# Patient Record
Sex: FEMALE | Race: WHITE | NOT HISPANIC OR LATINO | Employment: FULL TIME | URBAN - METROPOLITAN AREA
[De-identification: names, ages, dates, MRNs, and addresses within clinical notes are randomized per-mention and may not be internally consistent; named-entity substitution may affect disease eponyms.]

---

## 2017-06-13 ENCOUNTER — OFFICE VISIT (OUTPATIENT)
Dept: ORTHOPEDIC SURGERY | Facility: CLINIC | Age: 22
End: 2017-06-13

## 2017-06-13 VITALS
HEART RATE: 78 BPM | SYSTOLIC BLOOD PRESSURE: 164 MMHG | HEIGHT: 64 IN | BODY MASS INDEX: 50.02 KG/M2 | DIASTOLIC BLOOD PRESSURE: 73 MMHG | WEIGHT: 293 LBS

## 2017-06-13 DIAGNOSIS — E66.01 MORBID OBESITY WITH BMI OF 50.0-59.9, ADULT (HCC): ICD-10-CM

## 2017-06-13 DIAGNOSIS — R52 PAIN: Primary | ICD-10-CM

## 2017-06-13 DIAGNOSIS — M22.2X2 PATELLOFEMORAL DISORDER, LEFT: ICD-10-CM

## 2017-06-13 PROCEDURE — 99204 OFFICE O/P NEW MOD 45 MIN: CPT | Performed by: ORTHOPAEDIC SURGERY

## 2017-06-13 NOTE — PROGRESS NOTES
Orthopaedic Clinic Note: Knee New Patient    Chief Complaint   Patient presents with   • Left Knee - Pain        HPI    Layne Aguilar is a 22 y.o. morbidly obese female who presents with left knee pain for 6 month(s). Onset Was atraumatic in nature and has been gradually increasing in both frequency and severity since onset 6 months ago.. Pain is localized to the anterior knee and is a 6/10 on the pain scale. The pain is worse with climbing stairs, sitting and rising from seated position; ice and elevating the extremity make it better. Previous treatments have included: bracing and NSAIDS since symptom onset. Although some transient relief was reported with these interventions, these conservative measures have failed and symptoms have persisted. The patient is limited in daily activities and has had a significant decrease in quality of life as a result. She denies fevers, chills, or constitutional symptoms.    Past Medical History:   Diagnosis Date   • Acute maxillary sinusitis    • Acute pharyngitis    • Fatigue    • Knee pain       Past Surgical History:   Procedure Laterality Date   • ADENOIDECTOMY     • TONSILLECTOMY        Social History     Social History   • Marital status: Single     Spouse name: N/A   • Number of children: N/A   • Years of education: N/A     Occupational History   • Not on file.     Social History Main Topics   • Smoking status: Never Smoker   • Smokeless tobacco: Never Used   • Alcohol use No   • Drug use: No   • Sexual activity: Not on file     Other Topics Concern   • Not on file     Social History Narrative      Current Outpatient Prescriptions on File Prior to Visit   Medication Sig Dispense Refill   • [DISCONTINUED] phentermine 37.5 MG capsule Take 37.5 mg by mouth every morning.     • [DISCONTINUED] TRI-SPRINTEC 0.18/0.215/0.25 MG-35 MCG per tablet        No current facility-administered medications on file prior to visit.       No Known Allergies     Review of Systems  "  Constitutional: Negative.    HENT: Negative.    Eyes: Negative.    Respiratory: Negative.    Cardiovascular: Negative.    Gastrointestinal: Negative.    Endocrine: Negative.    Genitourinary: Negative.    Musculoskeletal: Positive for arthralgias, gait problem and joint swelling.   Skin: Negative.    Allergic/Immunologic: Negative.    Hematological: Negative.    Psychiatric/Behavioral: Negative.         The following portions of the patient's history were reviewed and updated as appropriate: allergies, current medications, past family history, past medical history, past social history, past surgical history and problem list.    Physical Exam  Blood pressure 164/73, pulse 78, height 64\" (162.6 cm), weight (!) 318 lb (144 kg), not currently breastfeeding.    Body mass index is 54.58 kg/(m^2).    GENERAL APPEARANCE: awake, alert & oriented x 3, in no acute distress, well developed, well nourished and obese  PSYCH: normal affect  LUNGS:  breathing nonlabored  EYES: sclera anicteric  CARDIOVASCULAR: palpable dorsalis pedis, palpable posterior tibial bilaterally. Capillary refill less than 2 seconds  EXTREMITIES: no clubbing, cyanosis  GAIT:  Normal            Right Lower Extremity Exam:   ----------  Hip Exam  ----------  FLEXION CONTRACTURE: None  FLEXION: 110 degrees  INTERNAL ROTATION: 20 degrees at 90 degrees of flexion   EXTERNAL ROTATION: 40 degrees at 90 degrees of flexion    PAIN WITH HIP MOTION: no  ----------  Knee Exam  ----------  ALIGNMENT: neutral, no varus or valgus deformity     RANGE OF MOTION:  Normal (0-130 degrees) with no extensor lag or flexion contracture  LIGAMENTOUS STABILITY:   stable to varus and valgus stress at 0 and 30 degrees without any evidence of laxity     STRENGTH:  5/5 knee flexion, extension. 5/5 ankle dorsiflexion and plantarflexion.     PAIN WITH PALPATION: denies tenderness to palpation about the knee, denies medial or lateral joint line pain  KNEE EFFUSION: no  PAIN WITH KNEE " ROM: no  PATELLAR CREPITUS: no  SPECIAL EXAM FINDINGS:  Negative patellar compression    REFLEXES:  PATELLAR 2+/4  ACHILLES 2+/4    CLONUS: negative  STRAIGHT LEG TEST:   negative    SENSATION TO LIGHT TOUCH:  DEEP PERONEAL/SUPERFICIAL PERONEAL/SURAL/SAPHENOUS/TIBIAL:   intact    EDEMA:  no  ERYTHEMA:  no  WOUNDS/INCISIONS: none, no overlying skin problems.      Left Lower Extremity Exam:   ----------  Hip Exam  ----------  FLEXION CONTRACTURE: None  FLEXION: 110 degrees  INTERNAL ROTATION: 20 degrees at 90 degrees of flexion   EXTERNAL ROTATION: 40 degrees at 90 degrees of flexion    PAIN WITH HIP MOTION: no  ----------  Knee Exam  ----------  ALIGNMENT: neutral, no varus or valgus deformity     RANGE OF MOTION:  Normal (0-130 degrees) with no extensor lag or flexion contracture  LIGAMENTOUS STABILITY:   stable to varus and valgus stress at 0 and 30 degrees without any evidence of laxity     STRENGTH:  5/5 knee flexion, extension. 5/5 ankle dorsiflexion and plantarflexion.     PAIN WITH PALPATION: denies tenderness to palpation about the knee, denies medial or lateral joint line pain  KNEE EFFUSION: no  PAIN WITH KNEE ROM: Yes, anteriorly with deep knee flexion   PATELLAR CREPITUS: yes, painful and symptomatic  SPECIAL EXAM FINDINGS:  Positive patellar compression    REFLEXES:  PATELLAR 2+/4  ACHILLES 2+/4    CLONUS: negative  STRAIGHT LEG TEST:   negative    SENSATION TO LIGHT TOUCH:  DEEP PERONEAL/SUPERFICIAL PERONEAL/SURAL/SAPHENOUS/TIBIAL:   intact    EDEMA:  no  ERYTHEMA:  no  WOUNDS/INCISIONS: none, no overlying skin problems.    ______________________________________________________________________  ______________________________________________________________________    RADIOGRAPHIC FINDINGS:   Indication: Left knee pain    Comparison: No prior xrays are available for comparison    Left knee 2views: minimal arthritic findings tricompartmentally      Assessment/Plan:   Diagnosis Plan   1. Pain  XR Knee 1 or  2 View Left   2. Patellofemoral disorder, left  Ambulatory Referral to Physical Therapy Evaluate and treat   3. Morbid obesity with BMI of 50.0-59.9, adult       I had an extensive discussion with patient regarding the etiology of her ongoing knee pain.  Her symptoms are related to patellofemoral arthralgia secondary to core, quad, hip relative weakness.  Therefore recommend referral physical therapy.  In addition to this I discussed with her extensively problem of obesity contributing to this and that if she does not correct her obesity, she will likely require joint replacement at a much earlier age secondary to increase joint reaction forces and wear.  She expressed understanding for this.  I will see her back on an as-needed basis.    Rickey Fountain MD  06/13/17  4:35 PM

## 2017-07-10 NOTE — TELEPHONE ENCOUNTER
Contacted patient.Patient  states that she has not been on birthcontrol for over 6 months. Patient will have to come in before she can get back on birthcontrol.

## 2017-10-13 ENCOUNTER — OFFICE VISIT (OUTPATIENT)
Dept: FAMILY MEDICINE CLINIC | Facility: CLINIC | Age: 22
End: 2017-10-13

## 2017-10-13 VITALS
HEIGHT: 64 IN | WEIGHT: 293 LBS | OXYGEN SATURATION: 99 % | TEMPERATURE: 98.6 F | BODY MASS INDEX: 50.02 KG/M2 | RESPIRATION RATE: 16 BRPM | SYSTOLIC BLOOD PRESSURE: 128 MMHG | HEART RATE: 66 BPM | DIASTOLIC BLOOD PRESSURE: 82 MMHG

## 2017-10-13 DIAGNOSIS — E66.9 OBESITY (BMI 35.0-39.9 WITHOUT COMORBIDITY): Primary | ICD-10-CM

## 2017-10-13 PROCEDURE — 99213 OFFICE O/P EST LOW 20 MIN: CPT | Performed by: PHYSICIAN ASSISTANT

## 2017-10-13 RX ORDER — PHENTERMINE HYDROCHLORIDE 37.5 MG/1
37.5 TABLET ORAL
COMMUNITY
End: 2021-05-21 | Stop reason: SDUPTHER

## 2017-10-13 NOTE — PROGRESS NOTES
Daisy Aguilar is a 22 y.o. female    History of Present Illness  Patient is 22-year-old black female comes in complaining of morbid obesity she states she's trying to lose weight she just started a new diet along with exercise she like to try phentermine again she's been on this medication the past and lost up to 25 pounds on it.  Denies any shortness breath chest pain  The following portions of the patient's history were reviewed and updated as appropriate: allergies, current medications, past social history and problem list    Review of Systems   Constitutional: Negative for appetite change, diaphoresis, fatigue and unexpected weight change.   Eyes: Negative for visual disturbance.   Respiratory: Negative for cough, chest tightness and shortness of breath.    Cardiovascular: Negative for chest pain, palpitations and leg swelling.   Gastrointestinal: Negative for diarrhea, nausea and vomiting.   Endocrine: Negative for polydipsia, polyphagia and polyuria.   Skin: Negative for color change and rash.   Neurological: Negative for dizziness, syncope, weakness, light-headedness, numbness and headaches.       Objective     Vitals:    10/13/17 1033   BP: 128/82   Pulse: 66   Resp: 16   Temp: 98.6 °F (37 °C)   SpO2: 99%       Physical Exam   Constitutional: She appears well-developed and well-nourished.   Neck: Neck supple. No JVD present. No thyromegaly present.   Cardiovascular: Normal rate, regular rhythm, normal heart sounds, intact distal pulses and normal pulses.    No murmur heard.  Pulmonary/Chest: Effort normal and breath sounds normal. No respiratory distress.   Abdominal: Soft. Bowel sounds are normal. There is no hepatosplenomegaly. There is no tenderness.   Musculoskeletal: She exhibits no edema.   Lymphadenopathy:     She has no cervical adenopathy.   Neurological: No sensory deficit.   Skin: Skin is warm and dry. She is not diaphoretic.   Nursing note and vitals reviewed.      Assessment/Plan      Diagnoses and all orders for this visit:    Obesity (BMI 35.0-39.9 without comorbidity)  -     phentermine (ADIPEX-P) 37.5 MG tablet; Take 37.5 mg by mouth Every Morning Before Breakfast.    Low-fat, low calorie diet, discussed exercising 3-5 times per week for 30 minutes decrease caloric intake around 18 -2000 jacqui a day.-year-old weight loss goal of 2 pounds per week for the next 2 months she's follow-up in 2 months sussy

## 2019-09-19 ENCOUNTER — OFFICE VISIT (OUTPATIENT)
Dept: FAMILY MEDICINE CLINIC | Facility: CLINIC | Age: 24
End: 2019-09-19

## 2019-09-19 VITALS
HEART RATE: 78 BPM | DIASTOLIC BLOOD PRESSURE: 75 MMHG | SYSTOLIC BLOOD PRESSURE: 120 MMHG | WEIGHT: 293 LBS | HEIGHT: 63 IN | OXYGEN SATURATION: 99 % | RESPIRATION RATE: 14 BRPM | BODY MASS INDEX: 51.91 KG/M2

## 2019-09-19 DIAGNOSIS — E66.01 CLASS 3 SEVERE OBESITY DUE TO EXCESS CALORIES WITHOUT SERIOUS COMORBIDITY WITH BODY MASS INDEX (BMI) OF 50.0 TO 59.9 IN ADULT (HCC): Primary | ICD-10-CM

## 2019-09-19 PROCEDURE — 99213 OFFICE O/P EST LOW 20 MIN: CPT | Performed by: PHYSICIAN ASSISTANT

## 2019-09-19 NOTE — PROGRESS NOTES
Subjective   Layne Aguilar is a 24 y.o. female  Obesity (RF phentermine)      History of Present Illness  Patient is a pleasant 24-year-old black female who comes in with obesity issues, her BMI is over 50 she states she is short of breath she is tried phentermine before she did start a workout program she is going to nutritionist she is try to do things healthy she is exercising 3-5 times per week for 30 minutes  The following portions of the patient's history were reviewed and updated as appropriate: allergies, current medications, past social history and problem list    Review of Systems   Constitutional: Negative for fatigue and unexpected weight change.   Respiratory: Negative for cough, chest tightness and shortness of breath.    Cardiovascular: Negative for chest pain, palpitations and leg swelling.   Gastrointestinal: Negative for nausea.   Skin: Negative for color change and rash.   Neurological: Negative for dizziness, syncope, weakness and headaches.       Objective     Vitals:    09/19/19 1004   BP: 120/75   Pulse: 78   Resp: 14   SpO2: 99%       Physical Exam   Constitutional: She appears well-developed and well-nourished.   Neck: Neck supple. No JVD present. No thyromegaly present.   Cardiovascular: Normal rate, regular rhythm, normal heart sounds, intact distal pulses and normal pulses.   No murmur heard.  Pulmonary/Chest: Effort normal and breath sounds normal. No respiratory distress.   Abdominal: Soft. Bowel sounds are normal. There is no hepatosplenomegaly. There is no tenderness.   Musculoskeletal: She exhibits no edema.   Lymphadenopathy:     She has no cervical adenopathy.   Neurological: No sensory deficit.   Skin: Skin is warm and dry. She is not diaphoretic.   Nursing note and vitals reviewed.      Assessment/Plan     Diagnoses and all orders for this visit:    Class 3 severe obesity due to excess calories without serious comorbidity with body mass index (BMI) of 50.0 to 59.9 in adult  (CMS/HCC)    Phentermine 37.5 mg 1 p.o. every day dispense 30×3 refills    Long discussion with patient about diet and exercise low-carb low calorie diet exercise 3-5 times per week for 30 minutes she is

## 2021-05-21 ENCOUNTER — OFFICE VISIT (OUTPATIENT)
Dept: FAMILY MEDICINE CLINIC | Facility: CLINIC | Age: 26
End: 2021-05-21

## 2021-05-21 VITALS
WEIGHT: 293 LBS | HEART RATE: 90 BPM | BODY MASS INDEX: 50.02 KG/M2 | OXYGEN SATURATION: 98 % | TEMPERATURE: 97.1 F | DIASTOLIC BLOOD PRESSURE: 90 MMHG | RESPIRATION RATE: 16 BRPM | HEIGHT: 64 IN | SYSTOLIC BLOOD PRESSURE: 136 MMHG

## 2021-05-21 DIAGNOSIS — E66.01 MORBID (SEVERE) OBESITY DUE TO EXCESS CALORIES (HCC): ICD-10-CM

## 2021-05-21 DIAGNOSIS — Z00.00 ROUTINE GENERAL MEDICAL EXAMINATION AT A HEALTH CARE FACILITY: Primary | ICD-10-CM

## 2021-05-21 DIAGNOSIS — E66.9 OBESITY (BMI 35.0-39.9 WITHOUT COMORBIDITY): ICD-10-CM

## 2021-05-21 PROCEDURE — 99395 PREV VISIT EST AGE 18-39: CPT | Performed by: PHYSICIAN ASSISTANT

## 2021-05-21 RX ORDER — PHENTERMINE HYDROCHLORIDE 37.5 MG/1
37.5 TABLET ORAL
Qty: 30 TABLET | Refills: 3 | OUTPATIENT
Start: 2021-05-21 | End: 2022-08-29

## 2021-05-21 NOTE — PROGRESS NOTES
Subjective   Layne Aguilar is a 25 y.o. female  Annual Exam (Fasting for labs. )      History of Present Illness  Patient is a very pleasant 24-year-old female who comes in for a mammogram patient, she has lost 20 pounds, she is exercising healthy diet her BMI is 52.18.  She is exercising, watching food portions increase in her carbs  The following portions of the patient's history were reviewed and updated as appropriate: allergies, current medications, past social history and problem list    Review of Systems   Constitutional: Negative.    HENT: Negative.    Eyes: Negative.    Respiratory: Negative.    Cardiovascular: Negative.    Gastrointestinal: Negative.    Endocrine: Negative.    Genitourinary: Negative.    Musculoskeletal: Negative.    Skin: Negative.    Allergic/Immunologic: Negative.    Neurological: Negative.    Hematological: Negative.    Psychiatric/Behavioral: Negative.    All other systems reviewed and are negative.      Objective     Vitals:    05/21/21 1043   BP: 136/90   Pulse: 90   Resp: 16   Temp: 97.1 °F (36.2 °C)   SpO2: 98%       Physical Exam  Vitals and nursing note reviewed.   Constitutional:       Appearance: She is well-developed.   HENT:      Head: Normocephalic and atraumatic.      Right Ear: External ear normal.      Left Ear: External ear normal.      Nose: Nose normal.   Eyes:      Conjunctiva/sclera: Conjunctivae normal.      Pupils: Pupils are equal, round, and reactive to light.   Neck:      Thyroid: No thyromegaly.      Vascular: No carotid bruit or JVD.   Cardiovascular:      Rate and Rhythm: Normal rate and regular rhythm.      Heart sounds: Normal heart sounds. No murmur heard.     Pulmonary:      Effort: Pulmonary effort is normal.      Breath sounds: Normal breath sounds.   Abdominal:      General: Bowel sounds are normal.      Palpations: Abdomen is soft. There is no mass.      Tenderness: There is no abdominal tenderness.   Musculoskeletal:         General: Normal range of  motion.      Cervical back: Normal range of motion and neck supple.   Lymphadenopathy:      Cervical: No cervical adenopathy.   Skin:     General: Skin is warm and dry.   Neurological:      Mental Status: She is alert and oriented to person, place, and time.      Cranial Nerves: No cranial nerve deficit.      Deep Tendon Reflexes: Reflexes are normal and symmetric.         Assessment/Plan     Diagnoses and all orders for this visit:    1. Routine general medical examination at a health care facility (Primary)  -     Comprehensive Metabolic Panel; Future  -     Lipid Panel; Future  -     TSH; Future  -     Vitamin D 25 hydroxy; Future    2. Morbid (severe) obesity due to excess calories (CMS/HCC)    3. BMI 50.0-59.9, adult (CMS/HCC)    Phentermine 37.5 mg 1 pill basements 30x2 refills    Preventive medicine discussed, diet, exercise, healthy living discussed.  I am so proud of Layne she has lost over 20 pounds she is really change her lifestyle making healthy choices exercising.  I went ahead and started her on phentermine 37.5 mg 1 p.o. every day keep her weight weekly we gave her a weight loss goal of 20 pounds by August.  She is to call and follow-up if there is any problem

## 2021-08-05 ENCOUNTER — TELEPHONE (OUTPATIENT)
Dept: FAMILY MEDICINE CLINIC | Facility: CLINIC | Age: 26
End: 2021-08-05

## 2021-08-05 NOTE — TELEPHONE ENCOUNTER
Mother advised to contact local health department and continue taking tylenol/ibuprofen. If symptoms worsen and she starts having SOB, go to the ED.

## 2021-08-05 NOTE — TELEPHONE ENCOUNTER
Caller: JT     Relationship to patient: MOM    Best call back number: [phone]    Date of exposure: 8-1-21    Date of positive COVID19 test: 8-4-21     COVID19 symptoms: FEVER, HEADACHES, BODY ACHES       Date of initial quarantine:     Additional information or concerns: PATIENT'S MOM IS WANTING TO KNOW HOW LONG SHE SHOULD STAY IN [place] AND IS THERE ANY THING ELSE THE PATIENT SHOULD BE DOING     What is the patients preferred pharmacy:   [facility]

## 2021-08-08 ENCOUNTER — DOCUMENTATION (OUTPATIENT)
Dept: FAMILY MEDICINE CLINIC | Facility: CLINIC | Age: 26
End: 2021-08-08

## 2021-08-08 DIAGNOSIS — E66.9 OBESITY (BMI 35.0-39.9 WITHOUT COMORBIDITY): ICD-10-CM

## 2021-08-08 RX ORDER — DEXTROMETHORPHAN HYDROBROMIDE AND PROMETHAZINE HYDROCHLORIDE 15; 6.25 MG/5ML; MG/5ML
2.5 SYRUP ORAL 4 TIMES DAILY PRN
Qty: 120 ML | Refills: 1 | OUTPATIENT
Start: 2021-08-08 | End: 2021-11-26

## 2021-09-10 ENCOUNTER — E-VISIT (OUTPATIENT)
Dept: FAMILY MEDICINE CLINIC | Facility: TELEHEALTH | Age: 26
End: 2021-09-10

## 2021-09-10 DIAGNOSIS — R06.2 WHEEZING: ICD-10-CM

## 2021-09-10 DIAGNOSIS — J06.9 UPPER RESPIRATORY TRACT INFECTION, UNSPECIFIED TYPE: Primary | ICD-10-CM

## 2021-09-10 PROBLEM — H66.90 OTITIS MEDIA: Status: ACTIVE | Noted: 2021-09-10

## 2021-09-10 PROCEDURE — 99422 OL DIG E/M SVC 11-20 MIN: CPT | Performed by: NURSE PRACTITIONER

## 2021-09-10 RX ORDER — METHYLPREDNISOLONE 4 MG/1
TABLET ORAL
Qty: 21 TABLET | Refills: 0 | OUTPATIENT
Start: 2021-09-10 | End: 2021-11-26

## 2021-09-10 RX ORDER — BROMPHENIRAMINE MALEATE, PSEUDOEPHEDRINE HYDROCHLORIDE, AND DEXTROMETHORPHAN HYDROBROMIDE 2; 30; 10 MG/5ML; MG/5ML; MG/5ML
10 SYRUP ORAL 4 TIMES DAILY PRN
Qty: 280 ML | Refills: 0 | OUTPATIENT
Start: 2021-09-10 | End: 2021-11-26

## 2021-09-10 RX ORDER — FLUTICASONE PROPIONATE 50 MCG
2 SPRAY, SUSPENSION (ML) NASAL DAILY
Qty: 16 ML | Refills: 0 | Status: SHIPPED | OUTPATIENT
Start: 2021-09-10 | End: 2022-11-22

## 2021-09-10 NOTE — PROGRESS NOTES
Layne Aguilar    1995  [id number]    I have reviewed the e-Visit questionnaire and patient's answers, my assessment and plan are as follows:    HPI- Layne Aguilar is a 26 y.o. female with complaints of rhinorrhea, nasal congestion, post nasal drip, dry itchy throat x 1-4 days. Cough is productive with clear, frothy sputum. She also has wheezing with deep breaths. Denies fever, sick contacts. She tested positive for COVID a little over 1 months ago. She is taking Robitussin for cough. She states she feels good other than wet persistent cough.     Review of Systems    Review of Systems - negative unless mentioned in HPI      Diagnoses and all orders for this visit:    1. Upper respiratory tract infection, unspecified type (Primary)  -     brompheniramine-pseudoephedrine-DM 30-2-10 MG/5ML syrup; Take 10 mL by mouth 4 (Four) Times a Day As Needed for Congestion, Cough or Allergies.  Dispense: 280 mL; Refill: 0  -     methylPREDNISolone (MEDROL) 4 MG dose pack; Take as directed on package instructions.  Dispense: 21 tablet; Refill: 0  -     fluticasone (Flonase) 50 MCG/ACT nasal spray; 2 sprays into the nostril(s) as directed by provider Daily.  Dispense: 16 mL; Refill: 0    2. Wheezing  -     methylPREDNISolone (MEDROL) 4 MG dose pack; Take as directed on package instructions.  Dispense: 21 tablet; Refill: 0      Take medicine as prescribed.   Take Tylenol for pain and/or fever, stay hydrated and rest.   If symptoms worsen or do not improve follow up with your PCP or visit your nearest Urgent Care Center or ER.      Any medications prescribed have been sent electronically to   [facility] [address] [place] [address] - [address] - [phone]  - [phone] FX  [address] [facility] [address]  Phone: [phone] Fax: [phone]    I spent 11 min reviewing this chart.     EDUARD Toribio  09/10/21  09:21 EDT

## 2021-09-10 NOTE — PATIENT INSTRUCTIONS
Take medicine as prescribed.   Take Tylenol for pain and/or fever, stay hydrated and rest.   If symptoms worsen or do not improve follow up with your PCP or visit your nearest Urgent Care Center or ER.          Viral Respiratory Infection  A respiratory infection is an illness that affects part of the respiratory system, such as the lungs, nose, or throat. A respiratory infection that is caused by a virus is called a viral respiratory infection.  Common types of viral respiratory infections include:  · A cold.  · The flu (influenza).  · A respiratory syncytial virus (RSV) infection.  What are the causes?  This condition is caused by a virus.  What are the signs or symptoms?  Symptoms of this condition include:  · A stuffy or runny nose.  · Yellow or green nasal discharge.  · A cough.  · Sneezing.  · Fatigue.  · Achy muscles.  · A sore throat.  · Sweating or chills.  · A fever.  · A headache.  How is this diagnosed?  This condition may be diagnosed based on:  · Your symptoms.  · A physical exam.  · Testing of nasal swabs.  How is this treated?  This condition may be treated with medicines, such as:  · Antiviral medicine. This may shorten the length of time a person has symptoms.  · Expectorants. These make it easier to cough up mucus.  · Decongestant nasal sprays.  · Acetaminophen or NSAIDs to relieve fever and pain.  Antibiotic medicines are not prescribed for viral infections. This is because antibiotics are designed to kill bacteria. They are not effective against viruses.  Follow these instructions at home:    Managing pain and congestion  · Take over-the-counter and prescription medicines only as told by your health care provider.  · If you have a sore throat, gargle with a salt-water mixture 3-4 times a day or as needed. To make a salt-water mixture, completely dissolve ½-1 tsp of salt in 1 cup of warm water.  · Use nose drops made from salt water to ease congestion and soften raw skin around your  nose.  · Drink enough fluid to keep your urine pale yellow. This helps prevent dehydration and helps loosen up mucus.  General instructions  · Rest as much as possible.  · Do not drink alcohol.  · Do not use any products that contain nicotine or tobacco, such as cigarettes and e-cigarettes. If you need help quitting, ask your health care provider.  · Keep all follow-up visits as told by your health care provider. This is important.  How is this prevented?    · Get an annual flu shot. You may get the flu shot in late summer, fall, or winter. Ask your health care provider when you should get your flu shot.  · Avoid exposing others to your respiratory infection.  ? Stay home from work or school as told by your health care provider.  ? Wash your hands with soap and water often, especially after you cough or sneeze. If soap and water are not available, use alcohol-based hand .  · Avoid contact with people who are sick during cold and flu season. This is generally fall and winter.  Contact a health care provider if:  · Your symptoms last for 10 days or longer.  · Your symptoms get worse over time.  · You have a fever.  · You have severe sinus pain in your face or forehead.  · The glands in your jaw or neck become very swollen.  Get help right away if you:  · Feel pain or pressure in your chest.  · Have shortness of breath.  · Faint or feel like you will faint.  · Have severe and persistent vomiting.  · Feel confused or disoriented.  Summary  · A respiratory infection is an illness that affects part of the respiratory system, such as the lungs, nose, or throat. A respiratory infection that is caused by a virus is called a viral respiratory infection.  · Common types of viral respiratory infections are a cold, influenza, and respiratory syncytial virus (RSV) infection.  · Symptoms of this condition include a stuffy or runny nose, cough, sneezing, fatigue, achy muscles, sore throat, and fevers or  chills.  · Antibiotic medicines are not prescribed for viral infections. This is because antibiotics are designed to kill bacteria. They are not effective against viruses.  This information is not intended to replace advice given to you by your health care provider. Make sure you discuss any questions you have with your health care provider.  Document Revised: 12/26/2019 Document Reviewed: 01/28/2019  [employer] Patient Education © 2021 [employer]    Broncoespasmo en los adultos  Bronchospasm, Adult    El broncoespasmo es un estrechamiento de las pequeñas vías respiratorias en los pulmones. [place] puede dificultar mucho la respiración. La hinchazón y más mucosidad de lo normal pueden sumarse a shaunna problema.  ¿Cuáles son las causas?  Las causas más frecuentes de esta afección incluyen las siguientes:  · Tener un resfrío.  · Actividad física.  · El olor que proviene de aerosoles, perfumes, gualberto y limpiadores.  · Aire frío.  · Estrés, gregory o llanto.  ¿Qué incrementa el riesgo?  · Tener asma.  · Fumar.  · Tener alergias.  · Ser alérgico a ciertos alimentos, medicamentos o picaduras de insectos.  ¿Cuáles son los signos o síntomas?  Los síntomas de esta afección incluyen:  · Comenzar a emitir sonidos sibilantes al respirar (sibilancias).  · Toser.  · Sensación de opresión en el pecho.  · Sensación de que no puede respirar normalmente.  · Sensación de que no tiene energía para hacer ejercicio.  · Respiración ruidosa.  · Tos aguda.  ¿Cómo se trata?  El tratamiento para esta afección puede incluir lo siguiente:  · Medicamentos que se aspiran. Estos abren las vías respiratorias y le permiten respirar. Los medicamentos pueden administrarse con un inhalador con medidor de dosis o un nebulizador.  · Medicamentos para reducir la hinchazón.  · Eliminar lo que ha iniciado el broncoespasmo.  Siga estas instrucciones en arauz casa:  Medicamentos  · Use los medicamentos de venta kristopher y los recetados solamente poli se lo haya indicado  el médico.  · Si necesita un inhalador o un nebulizador para tank arauz medicamento, pregúntele a arauz médico cómo usarlo.  · Es posible que le den un espaciador para usar con el inhalador. [place] facilita el ingreso del medicamento del inhalador en los pulmones.  Estilo de bartolo  · No consuma ningún producto que contenga nicotina o tabaco. Estos incluyen cigarrillos, tabaco para mascar y cigarrillos electrónicos. Si necesita ayuda para dejar de consumir estos productos, consulte al médico.  · Lleve un registro de los factores que desencadenan el broncoespasmo. Evítelos si puede hacerlo.  · Cuando los niveles de polen, contaminación del aire o humedad melissa altos, mantenga las ventanas cerradas. Si tiene, use un aire acondicionado.  · Busque formas de manejar el estrés y los sentimientos.  Actividad  Algunas personas sufren broncoespasmo cuando realizan actividad física. [place] se denomina broncoconstricción inducida por el ejercicio (BCIE). Si tiene shaunna problema, hable con el médico sobre cómo lidiar con la BCIE. Algunos consejos son:  · Use el inhalador antes de hacer ejercicio.  · Autumn ejercicio en el interior si el clima está muy frío, húmedo, o si los niveles de polen y moho están elevados.  · Precaliente antes de hacer ejercicio y autumn movimientos de enfriamiento después.  · Deje de hacer ejercicio de inmediato si comienza a tener síntomas o estos empeoran.  Instrucciones generales  · Si tiene asma, debe disponer de un plan de acción para el asma.  · Manténgase al día con las vacunas (inmunizaciones).  · Concurra a todas las visitas de seguimiento poli se lo haya indicado el médico. [place] es importante.  Solicite ayuda de inmediato si:  · Tiene dificultad para respirar.  · Tiene sibilancias y tos, y esto no mejora después de tank medicamentos.  · Siente dolor en el pecho.  · Tiene dificultad para hablar más de duarte palabra en duarte oración.  Estos síntomas pueden indicar duarte emergencia. No espere a josefina si los síntomas  desaparecen. Solicite atención médica de inmediato. Comuníquese con el servicio de emergencias de arauz localidad (911 en los [place]). No conduzca por niranjan propios medios hasta el hospital.  Resumen  · El broncoespasmo es un estrechamiento de las pequeñas vías respiratorias en los pulmones. La hinchazón y más mucosidad de lo normal pueden sumarse a shaunna problema. [place] puede dificultar mucho la respiración.  · Obtenga ayuda de inmediato si tiene sibilancias y tos, y esto no mejora después de tank medicamentos.  · No consuma ningún producto que contenga nicotina o tabaco. Estos incluyen cigarrillos, tabaco para mascar y cigarrillos electrónicos.  Esta información no tiene poli fin reemplazar el consejo del médico. Asegúrese de hacerle al médico cualquier pregunta que tenga.  Document Revised: 03/23/2021 Document Reviewed: 03/23/2021  Elsevier Patient Education © 2021 [employer]

## 2021-09-23 ENCOUNTER — TELEPHONE (OUTPATIENT)
Dept: FAMILY MEDICINE CLINIC | Facility: CLINIC | Age: 26
End: 2021-09-23

## 2021-09-27 ENCOUNTER — TELEPHONE (OUTPATIENT)
Dept: FAMILY MEDICINE CLINIC | Facility: CLINIC | Age: 26
End: 2021-09-27

## 2021-10-15 ENCOUNTER — E-VISIT (OUTPATIENT)
Dept: FAMILY MEDICINE CLINIC | Facility: TELEHEALTH | Age: 26
End: 2021-10-15

## 2021-10-15 DIAGNOSIS — B37.31 VAGINAL YEAST INFECTION: Primary | ICD-10-CM

## 2021-10-15 PROCEDURE — 99422 OL DIG E/M SVC 11-20 MIN: CPT | Performed by: NURSE PRACTITIONER

## 2021-10-15 RX ORDER — FLUCONAZOLE 150 MG/1
TABLET ORAL
Qty: 3 TABLET | Refills: 0 | OUTPATIENT
Start: 2021-10-15 | End: 2021-11-26

## 2021-10-15 NOTE — PATIENT INSTRUCTIONS
Take 1 tablet every 3 days. If symptoms have not improved in a week follow up with your primary care provider, urgent care or gynecologist   If symptoms worsen go to urgent care    Vaginal Yeast Infection, Adult    Vaginal yeast infection is a condition that causes vaginal discharge as well as soreness, swelling, and redness (inflammation) of the vagina. This is a common condition. Some women get this infection frequently.  What are the causes?  This condition is caused by a change in the normal balance of the yeast (candida) and bacteria that live in the vagina. This change causes an overgrowth of yeast, which causes the inflammation.  What increases the risk?  The condition is more likely to develop in women who:  · Take antibiotic medicines.  · Have diabetes.  · Take birth control pills.  · Are pregnant.  · Douche often.  · Have a weak body defense system (immune system).  · Have been taking steroid medicines for a long time.  · Frequently wear tight clothing.  What are the signs or symptoms?  Symptoms of this condition include:  · White, thick, creamy vaginal discharge.  · Swelling, itching, redness, and irritation of the vagina. The lips of the vagina (vulva) may be affected as well.  · Pain or a burning feeling while urinating.  · Pain during sex.  How is this diagnosed?  This condition is diagnosed based on:  · Your medical history.  · A physical exam.  · A pelvic exam. Your health care provider will examine a sample of your vaginal discharge under a microscope. Your health care provider may send this sample for testing to confirm the diagnosis.  How is this treated?  This condition is treated with medicine. Medicines may be over-the-counter or prescription. You may be told to use one or more of the following:  · Medicine that is taken by mouth (orally).  · Medicine that is applied as a cream (topically).  · Medicine that is inserted directly into the vagina (suppository).  Follow these instructions at  home:    Lifestyle  · Do not have sex until your health care provider approves. Tell your sex partner that you have a yeast infection. That person should go to his or her health care provider and ask if they should also be treated.  · Do not wear tight clothes, such as pantyhose or tight pants.  · Wear breathable cotton underwear.  General instructions  · Take or apply over-the-counter and prescription medicines only as told by your health care provider.  · Eat more yogurt. This may help to keep your yeast infection from returning.  · Do not use tampons until your health care provider approves.  · Try taking a sitz bath to help with discomfort. This is a warm water bath that is taken while you are sitting down. The water should only come up to your hips and should cover your buttocks. Do this 3-4 times per day or as told by your health care provider.  · Do not douche.  · If you have diabetes, keep your blood sugar levels under control.  · Keep all follow-up visits as told by your health care provider. This is important.  Contact a health care provider if:  · You have a fever.  · Your symptoms go away and then return.  · Your symptoms do not get better with treatment.  · Your symptoms get worse.  · You have new symptoms.  · You develop blisters in or around your vagina.  · You have blood coming from your vagina and it is not your menstrual period.  · You develop pain in your abdomen.  Summary  · Vaginal yeast infection is a condition that causes discharge as well as soreness, swelling, and redness (inflammation) of the vagina.  · This condition is treated with medicine. Medicines may be over-the-counter or prescription.  · Take or apply over-the-counter and prescription medicines only as told by your health care provider.  · Do not douche. Do not have sex or use tampons until your health care provider approves.  · Contact a health care provider if your symptoms do not get better with treatment or your symptoms go  away and then return.  This information is not intended to replace advice given to you by your health care provider. Make sure you discuss any questions you have with your health care provider.  Document Revised: 07/17/2020 Document Reviewed: 05/06/2019  Elsevier Patient Education © 2021 [employer] Inc.

## 2021-10-15 NOTE — PROGRESS NOTES
Layne Aguilar    1995  [id number]    I have reviewed the e-Visit questionnaire and patient's answers, my assessment and plan are as follows:      HPI  Layne Aguilar is a 26 y.o. with symptoms of yeast infection for 5 days. She has some discharge, but this cleared up after taking he Monistat for 3 days. It did not completely resolve. The discharge is now clear.     Review of Systems - Genito-Urinary ROS: positive for - vulvar/vaginal symptoms  negative for - dysuria, genital ulcers, pelvic pain or urinary frequency/urgency      Diagnoses and all orders for this visit:    1. Vaginal yeast infection (Primary)    Other orders  -     fluconazole (Diflucan) 150 MG tablet; Take 1 tablet every 3 days x 3  Dispense: 3 tablet; Refill: 0        Any medications prescribed have been sent electronically to   [facility] [address] [place] [address] - [address] [place] [address] - [phone]  - [phone]   [address] [place] [address]  Phone: [phone] Fax: [phone]      Time Documentation  Counseled patient  Counseling topics: diagnosis, treatment options, follow up plan and return instructions  Total encounter time: counseling time more than 50% of visit: 13        EDUARD Rucker  10/15/21  17:01 EDT

## 2021-10-16 NOTE — PROGRESS NOTES
"I counseled the patient to follow up with ED tonight if SOA or difficulty breathing; otherwise PCP/UC tomorrow.    Patient reports cough with \"wheezing and wet lung sounds\" s/p COVID infection  "

## 2021-10-26 ENCOUNTER — E-VISIT (OUTPATIENT)
Dept: FAMILY MEDICINE CLINIC | Facility: TELEHEALTH | Age: 26
End: 2021-10-26

## 2021-10-26 PROCEDURE — BRIGHTMDVISIT: Performed by: NURSE PRACTITIONER

## 2021-10-27 NOTE — E-VISIT TREATED
Chief Complaint: Yeast infection   Patient introduction   Patient is 26-year-old female presenting with > 7 days of change in vaginal discharge.   Patient-submitted comments   Patient writes: I was treated with diflucan last week for a possible yeast infections - symptoms are still present and seem to be more consist with BV.   General presentation   Describes vaginal discharge as thin. Denies vaginal discharge is white, gray or off-white, yellow, or green.   Denies genital erythema, genital edema, and darkening of genital skin. Denies pain in or around vagina. Denies vaginal dryness. Symptom onset was before menses. Denies vaginal bleeding or spotting unrelated to menstruation.   Reports being sexually active in the past 90 days. Denies having a new sex partner in previous 2 weeks. Denies STI treatment within the past 3 months.   Denies taking any hormonal medication. Denies recent use of douching products. Denies recent use of a product containing nonoxynol-9. Denies new and recent use of possible irritants. Denies taking antibiotics in the last 2 weeks.   Positive history of vulvovaginal candidiasis with 1-3 episodes in the previous 12 months. Current symptoms are different from previous episodes of vulvovaginal candidiasis.   Has tried an OTC topical yeast infection treatment for current symptoms.   Reports positive history of bacterial vaginosis with no episodes in the previous 12 months. Reports current symptoms are similar to previous episodes of bacterial vaginosis. Has used oral metronidazole for past episode(s) of bacterial vaginosis. Reports oral metronidazole effectively treated prior episode(s) of bacterial vaginosis.   Patient denies the following red flags:    Frothy or foamy vaginal discharge    Green vaginal discharge    Genital trauma    Genital sores    Abdominal or pelvic surgery within the last month    Fever    Vomiting    Abdominal pain    Retained foreign object in vagina    Treatment for  an STI in the last 3 months    Sexual partner tested positive for an STI   Self-exam: Vaginal pH reported as abnormal per home vaginal health screening kit.   Pregnancy/menstrual status/breastfeeding:   Denies being pregnant. Denies breastfeeding. Regarding last menstrual period, patient writes: first day of last cycle 10/18/21.   Preferred medication route:   Prefers oral treatment option.   Denies previous antibiotic-induced yeast infection.   Current medications   Denies taking other medications or supplements.   Medication allergies   None.   Medication contraindication review   None.   Past medical history   No diabetes mellitus.   Immune conditions: Denies immunocompromising conditions. Denies history of cancer.   Social history   Non-smoker.   Assessment   Bacterial vaginosis.   This is the likely diagnosis based on patient's interview responses, including:    Thin vaginal discharge.    Sexual activity in the past 90 days.    Abnormal vaginal pH result with home testing kit.    Prior diagnosis of bacterial vaginosis with similar symptoms.    Absence of genital erythema, edema, and darkening of the skin.   Plan   Medications:    fluconazole 150 mg tablet RX 150mg 1 tab PO once 1d as a single dose for vaginal yeast infection. Repeat in 72 hours if symptoms persist. Amount is 2 tab.    metronidazole 500 mg tablet RX 500mg 1 tab PO bid 7d for infection. Do not drink alcohol while taking this medication and for 24 hours after you finish the course of this medication. This medication is an antibiotic. Take it exactly as directed. You must finish the entire course of medication, even if you feel better after the first several days. Amount is 14 tab.   The patient's prescriptions will be sent to:   [facility]   [address] [address]   Phone: [phone]     Fax: [phone]   Education:    Condition and causes    Prevention    Treatment and self-care    When to call  provider   Follow-up:   Patient to follow up as needed for progression or lack of improvement in symptoms within 7-10 days.      ----------   Electronically signed by EDUARD Moran on 2021-10-26 at 18:01PM   ----------   Patient Interview Transcript:   Which of these symptoms are bothering you? Scroll to see all options. Select all that apply.    Vaginal discharge that looks different than usual   Not selected:    Itching around my vagina    Itching in my vagina    Burning around my vagina    Burning in my vagina    Fishy-smelling vaginal discharge    Pain during sex    Burning with urination   How long have you had these symptoms? Select one.    More than 7 days   Not selected:    Less than 24 hours    1 to 3 days    4 to 7 days   How would you describe your vaginal discharge? Select one.    Thin   Not selected:    Thick and clumpy, like cottage cheese    Frothy or foamy    None of the above   What color is your vaginal discharge? Select one.    None of the above   Not selected:    White    Gray or off-white    Yellow    Green   Do you have any vaginal dryness? Select one.    No   Not selected:    Yes   Have you had any unexpected vaginal bleeding or spotting? By unexpected, we mean either between your normal periods or after you've gone through menopause. Select one.    No   Not selected:    Yes   Is there any redness, swelling, or darkening of the skin in or around your vagina? Select all that apply.    None of the above   Not selected:    Redness    Swelling    Darkening of the skin    I'm not sure   Do you have any pain in or around your vagina? Select one.    No   Not selected:    Yes   Since your symptoms started, have you used a vaginal health screening kit to check the acidity (pH) of your vaginal discharge? These kits are available without a prescription at many drug stores and pharmacies. Common brands include Monistat Complete Care Vaginal Health Test and Vagisil Screening Kit. Select one.     Yes   Not selected:    No   What was the result of the vaginal health screening test? Select one.    Abnormal acidity   Not selected:    Normal acidity    I'm not sure   Have you noticed any sores on your genital area? This includes blisters or ulcers. Select one.    No   Not selected:    Yes   Along with your vaginal symptoms, have you had any of these symptoms? Select all that apply.    None of the above   Not selected:    Fever    Vomiting    Abdominal (stomach) pain   Before your symptoms began, did you have an injury to your genital area or vagina? Select one.    No   Not selected:    Yes   Could an object like a tampon or condom be stuck inside your vagina? Select one.    No   Not selected:    Yes   In the 2 weeks before your symptoms began, did you use any douching products? Select one.    No   Not selected:    Yes   In the 2 weeks before your symptoms began, did you use any products containing nonoxynol-9? Nonoxynol-9 is a spermicide commonly found in birth control products, including condoms, sponges, vaginal films, and jellies. Select one.    No   Not selected:    Yes   In the week before your symptoms started, did any of these come into contact with your genital area? Scroll to see all options. Select all that apply.    None of the above   Not selected:    A new soap    Underwear washed with a new laundry detergent    A new sex toy    A new cream or lotion    A new medication    A new perfume    A new feminine or flushable wipe    Other (specify)   Have you had a yeast infection before? Select one.    Yes, but my current symptoms feel different than before   Not selected:    Yes, and my current symptoms feel the same as before    No   How many yeast infections have you had in the last 12 months? Select one.    1 to 3   Not selected:    0    4 or more    I'm not sure   Have you ever been treated for bacterial vaginosis (BV)? Select one.    Yes   Not selected:    No   Compared to the last time you were  treated for BV, how do your current symptoms feel? Select one.    The same   Not selected:    Different   In the last 12 months, how many times have you been treated for BV? Select one.    0   Not selected:    1 to 3    4 or more    I'm not sure   In the past, have you developed yeast infections as a result of taking oral antibiotics? Select one.    No   Not selected:    Yes   Were you sexually active at any time in the last 3 months? Select one.    Yes   Not selected:    No   Have you had sex with a new partner in the last 2 weeks? Select one.    No   Not selected:    Yes   Have you had sex with 3 or more partners in the last 3 months? Select one.    No   Not selected:    Yes   In the last 3 months, were you treated for a sexually transmitted infection (STI)? Examples of STIs include chlamydia, gonorrhea, trichomoniasis (trich), herpes, or syphilis. Select one.    No   Not selected:    Yes   Has your sexual partner(s) recently tested positive for a sexually transmitted infection (STI)? Select all that apply.    No, not that I know of   Not selected:    Yes, chlamydia    Yes, gonorrhea    Yes, trichomoniasis (trich)    Other (specify)   Are you pregnant? Select one.    No   Not selected:    Yes   When was your last menstrual period? If you don't currently have periods or no longer have periods, please briefly explain.    first day of last cycle 10/18/21   Are you breastfeeding? Select one.    No   Not selected:    Yes   Did your symptoms start before, during, or after your menstrual period? Select one.    Before   Not selected:    During    After    I don't currently have periods or no longer have periods    I'm not sure   Have you recently had abdominal or pelvic surgery? Select one.    No   Not selected:    Yes, within the last month    Yes, within the last 3 months   Are you currently being treated for Type 1 or Type 2 diabetes? Select one.    No   Not selected:    Yes   Do you have any of these conditions that  "can affect the immune system? Scroll to see all options. Select all that apply.    None of these   Not selected:    History of bone marrow transplant    Chronic kidney disease    Chronic liver disease (including cirrhosis)    HIV/AIDS    Inflammatory bowel disease (Crohn's disease or ulcerative colitis)    Lupus    Moderate to severe plaque psoriasis    Multiple sclerosis    Rheumatoid arthritis    Sickle cell anemia    Alpha or beta thalassemia    History of solid organ transplant (kidney, liver, or heart)    History of spleen removal    An autoimmune disorder not listed here    A condition requiring treatment with long-term use of oral steroids (such as prednisone, prednisolone, or dexamethasone)   Have you ever been diagnosed with cancer? Select one.    No   Not selected:    Yes, I have cancer now    Yes, but I'm in remission   Have you been treated for antibiotic-associated colitis in the last month? This is also called \"C. diff colitis.\" It's commonly caused by the bacteria Clostridium difficile. Select one.    No   Not selected:    Yes   Do you smoke tobacco? Select one.    No, never   Not selected:    Yes, every day    Yes, some days    No, I quit   Have you tried any of these over-the-counter treatments for your current symptoms? Select all that apply.    Vaginal cream, ointment, or suppository for yeast infection   Not selected:    Anti-itch cream or ointment containing hydrocortisone    Vaginal wash    Vaginal wipes, such as Summer's Parul    Homeopathic treatment    Other (specify)    I haven't tried anything for my symptoms   Do you take or use any of these medications containing estrogen or progesterone? Select one.    None of the above   Not selected:    Birth control pills    Hormonal intrauterine device (IUD)    Contraceptive patch    Vaginal ring, such as NuvaRing    Birth control shot, such as Depo-Provera    Hormone replacement therapy (HRT), such as oral and topical medication, vaginal ring, and " transdermal patch   Which medication(s) have you used for BV? Select all that apply.    Metronidazole oral tablets (Flagyl)   Not selected:    Clindamycin oral capsules (Cleocin)    Clindamycin vaginal cream (Cleocin Vaginal, ClindaMax Vaginal, Clindesse)    Clindamycin vaginal ovule (Cleocin Vaginal)    Metronidazole vaginal gel (MetroGel Vaginal, Nuvessa, Vandazole)    Tinidazole oral tablets (Tindamax)    Other (specify)    I'm not sure   Which medication(s) cleared up all of your BV symptoms before? Select all that apply.    Metronidazole oral tablets (Flagyl)   Not selected:    None of the above    I'm not sure   In the last 2 weeks, have you taken oral antibiotics? Oral antibiotics are medications that you take by mouth to treat a bacterial infection. Select one.    No   Not selected:    Yes   Are you taking any other medications or supplements? On the next screen, you need to list all vitamins, supplements, non-prescription medications (such as aspirin or Aleve), and prescription medications that you're taking. Select one.    No   Not selected:    Yes    Yes, but I'm not sure what they are   Have you ever had an allergic or bad reaction to any medication? Select one.    No   Not selected:    Yes   Have you used the medication disulfiram (Antabuse) in the last 2 weeks? Select one.    No   Not selected:    Yes   If medication is needed, would you prefer oral or vaginal medication? - Oral medications are pills that you swallow. - Vaginal medications are gels, creams, ointments, or suppositories that are placed in the vagina. We'll try to provide medication in the form you prefer, but that's not always possible. Select one.    Oral   Not selected:    Vaginal    No preference   Is there anything else you'd like to tell us about your symptoms?    I was treated with diflucan last week for a possible yeast infections - symptoms are still present and seem to be more consist with BV   ----------   Medical history    Medical history data does not currently exist for this patient.

## 2021-10-27 NOTE — EXTERNAL PATIENT INSTRUCTIONS
Note   I am sending in a yeast pill to take in case you need it after taking the flagyl. If symptoms persist despite this treatment, make sure to be seen in person!   Diagnosis   Bacterial vaginosis   My name is Lucy Levin, and I'm a healthcare provider at [facility]. I'm sorry to hear about the discomfort you've been having. I've reviewed your interview, and it looks like you have bacterial vaginosis (BV). BV is a very common vaginal infection. BV is NOT considered a sexually transmitted infection (STI).   Medications   Your pharmacy   [facility] [address] [address] [phone]     Prescription      Fluconazole (150mg): Take 1 tablet by mouth once for 1 day as a single dose. Use this medication only if you develop a yeast infection. If symptoms are still present after 3 days, you may take a second tablet.      Metronidazole (500mg): Take 1 tablet by mouth twice a day for 7 days for infection. Do not drink alcohol while taking this medication and for 24 hours after you finish the course of this medication. This medication is an antibiotic. Take it exactly as directed. You must finish the entire course of medication, even if you feel better after the first several days.    Metronidazole is the treatment recommended by the Centers for Disease Control and Prevention (CDC) for BV.    The antibiotic I've prescribed for your BV can sometimes cause a yeast infection. Just in case, I've also prescribed Diflucan (fluconazole). Diflucan is an oral antifungal that treats yeast infections. You don't have to take the Diflucan unless you develop yeast infection symptoms. The most common symptom is vaginal itching, sometimes along with a lumpy white discharge. If you notice either of these, then take the Diflucan as directed.   About your diagnosis   Bacterial vaginosis (BV) is a vaginal infection that happens when there are changes in the number and type of bacteria that  normally live in the vagina. Overgrowth of certain bacteria then leads to symptoms commonly associated with BV. We're not sure what causes this overgrowth of bacteria, but we do have effective treatments to cure the infection. Although BV isn't considered a sexually transmitted infection (STI), sexual activity is a risk factor for getting BV.   What to expect   If you follow this treatment plan, you should feel better in about 1 week. It's important that you finish all your medications, even if you feel better after the first several days.   When to seek care   Call us at [phone]   with any sudden or unexpected symptoms.    Symptoms that change or get worse.    Symptoms that don't go away, even after completing your treatment plan.    A fever of 101F or higher.    Frothy or foamy vaginal discharge.    Green or yellow vaginal discharge.    Spotting or bleeding unrelated to your period.    Severe abdominal cramping or pain.    Sores on your genital area.    Nausea or vomiting.   Other treatment   Avoid sex or use condoms consistently and correctly while you're being treated for BV.   BV can increase your risk of getting sexually transmitted infections (STIs), such as HIV, gonorrhea, chlamydia, and herpes. Talk to your healthcare provider about screening for these and other STIs.   Prevention    Use unscented soaps.    Make sure you rinse off all soap or shower gel when bathing or showering.    If you take baths, don't add soap or any other bath products to the bath water.    After bathing, dry off completely before you get dressed.    Don't use scented sanitary pads or tampons.    Avoid using scented condoms or dental dams.    Urinate after sex.    Drink plenty of water.    Avoid douching products. Douching can increase the risk of vaginal infections.    Avoid products that contain nonoxynol-9.   Your provider   Your diagnosis was provided by Lucy Levin, a member of your trusted care team at [facility].   If you have any questions, call us at [phone]  .

## 2021-12-25 ENCOUNTER — HOSPITAL ENCOUNTER (EMERGENCY)
Facility: HOSPITAL | Age: 26
Discharge: HOME | End: 2021-12-25
Attending: EMERGENCY MEDICINE | Admitting: EMERGENCY MEDICINE

## 2021-12-25 ENCOUNTER — APPOINTMENT (OUTPATIENT)
Dept: GENERAL RADIOLOGY | Facility: HOSPITAL | Age: 26
End: 2021-12-25

## 2021-12-25 VITALS
TEMPERATURE: 97.8 F | SYSTOLIC BLOOD PRESSURE: 111 MMHG | WEIGHT: 293 LBS | BODY MASS INDEX: 50.02 KG/M2 | OXYGEN SATURATION: 97 % | DIASTOLIC BLOOD PRESSURE: 79 MMHG | RESPIRATION RATE: 16 BRPM | HEART RATE: 88 BPM | HEIGHT: 64 IN

## 2021-12-25 DIAGNOSIS — J20.9 ACUTE BRONCHITIS, UNSPECIFIED ORGANISM: ICD-10-CM

## 2021-12-25 DIAGNOSIS — R06.2 WHEEZING: ICD-10-CM

## 2021-12-25 DIAGNOSIS — J18.9 ATYPICAL PNEUMONIA: Primary | ICD-10-CM

## 2021-12-25 DIAGNOSIS — R06.02 SHORTNESS OF BREATH: ICD-10-CM

## 2021-12-25 LAB
FLUAV SUBTYP SPEC NAA+PROBE: NOT DETECTED
FLUBV RNA ISLT QL NAA+PROBE: NOT DETECTED
SARS-COV-2 RNA PNL SPEC NAA+PROBE: NOT DETECTED

## 2021-12-25 PROCEDURE — 87636 SARSCOV2 & INF A&B AMP PRB: CPT | Performed by: PHYSICIAN ASSISTANT

## 2021-12-25 PROCEDURE — 71045 X-RAY EXAM CHEST 1 VIEW: CPT

## 2021-12-25 PROCEDURE — 99283 EMERGENCY DEPT VISIT LOW MDM: CPT

## 2021-12-25 RX ORDER — AZITHROMYCIN 250 MG/1
250 TABLET, FILM COATED ORAL DAILY
Qty: 6 TABLET | Refills: 0 | OUTPATIENT
Start: 2021-12-25 | End: 2022-08-29

## 2021-12-25 RX ORDER — PREDNISONE 10 MG/1
TABLET ORAL
Qty: 21 TABLET | Refills: 0 | OUTPATIENT
Start: 2021-12-25 | End: 2022-08-29

## 2021-12-25 NOTE — DISCHARGE INSTRUCTIONS
Continue your albuterol inhaler as previously prescribed for wheezing.  Prednisone and Zithromax as prescribed.  Follow up with your PCP (call for appointment).  Return if worse.

## 2021-12-25 NOTE — ED PROVIDER NOTES
Subjective   Ms. Aguilar is a pleasant 26-year-old female who presents to the emergency department with complaints of persistent cough and wheezing for the past 2 months.  The patient states that her symptoms started while taking a shower and accidentally letting some water run into her nose and getting choked on it.  She was seen at [facility] [place] urgent care 2 weeks ago for the same complaints and was diagnosed with bronchitis and was started on an unknown antibiotic, Stahist, steroids and an inhaler.  The patient states that she briefly got better but has now gotten worse.  She has no known respiratory history.  She denies any other health problems other than PCOS.  She is a non-smoker.  She denies alcohol use.  She does smoke marijuana on a daily basis.  The patient denies any recent exposure to COVID-19.  She has no associated chest pain or fever.  No lower extremity edema.          Review of Systems   Constitutional: Negative for appetite change, chills and fever.   HENT: Negative for sore throat.    Respiratory: Positive for cough, shortness of breath and wheezing.    Cardiovascular: Negative for chest pain and palpitations.   Gastrointestinal: Negative for abdominal pain, diarrhea, nausea and vomiting.   Genitourinary: Negative for dysuria.   Musculoskeletal: Negative for back pain.   Skin: Negative for pallor and rash.   Allergic/Immunologic: Negative for immunocompromised state.   Neurological: Negative for dizziness, light-headedness and headaches.   Hematological: Negative.    Psychiatric/Behavioral: Negative.        Past Medical History:   Diagnosis Date   • Acute maxillary sinusitis    • Acute pharyngitis    • Fatigue    • Knee pain        No Known Allergies    Past Surgical History:   Procedure Laterality Date   • ADENOIDECTOMY     • TONSILLECTOMY         Family History   Problem Relation Age of Onset   • Hypertension Mother    • Diabetes Father    • No Known Problems Brother    • Diabetes Other        Social  History     Socioeconomic History   • Marital status: Single   Tobacco Use   • Smoking status: Never Smoker   • Smokeless tobacco: Never Used   Substance and Sexual Activity   • Alcohol use: No   • Drug use: Yes     Types: Marijuana           Objective   Physical Exam  Constitutional:       General: She is not in acute distress.     Appearance: She is well-developed.   HENT:      Head: Normocephalic.      Nose: Nose normal.      Mouth/Throat:      Mouth: Mucous membranes are moist.   Eyes:      General: No scleral icterus.     Pupils: Pupils are equal, round, and reactive to light.   Cardiovascular:      Rate and Rhythm: Normal rate and regular rhythm.      Heart sounds: No murmur heard.      Pulmonary:      Effort: Pulmonary effort is normal. No respiratory distress.      Breath sounds: Wheezing (Mild, bibasilar) present.   Abdominal:      General: Bowel sounds are normal.      Tenderness: There is no abdominal tenderness. There is no guarding.   Musculoskeletal:         General: No tenderness. Normal range of motion.      Cervical back: Normal range of motion.      Right lower leg: No edema.      Left lower leg: No edema.   Skin:     General: Skin is warm.   Neurological:      General: No focal deficit present.      Mental Status: She is alert and oriented to person, place, and time.   Psychiatric:         Mood and Affect: Mood normal.         Procedures           ED Course      Differential diagnosis includes bronchitis, pneumonia, COVID-19, viral URI, etc.  A COVID-19 swab will be obtained.  A portable chest x-ray has been ordered.    The patient appears in no acute distress.  Her O2 sats are in the upper 90s on room air.  She does have mild bibasilar wheezes.      Chest x-ray:  Subtle interstitial changes of the lateral left chest and  lateral right lung base. This is not well seen, but with history of  cough and dyspnea, consider early changes of Covid 19 pneumonia or other atypical pneumonia.    Her COVID-19  swab is negative for COVID-19 or influenza.    I will plan to discharge her on Zpack, prednisone taper and continue her albuterol MDI and have f/u or return if worse.                                                 MDM    Final diagnoses:   Atypical pneumonia   Wheezing   Shortness of breath       ED Disposition  ED Disposition     ED Disposition Condition Comment    Discharge Stable           Stephen Hercules PA  [address] [facility] [address] [facility] [address]  [place] [address]  [phone]      call for follow up in office    [facility]  [address] [id number] [phone]             Medication List      New Prescriptions    azithromycin 250 MG tablet  Commonly known as: ZITHROMAX  Take 1 tablet by mouth Daily. Take 2 tablets the first day, then 1 tablet daily for 4 days.     predniSONE 10 MG (21) dose pack  Commonly known as: DELTASONE  Use as directed on package           Where to Get Your Medications      [facility] [place] [address] [facility] [place], [place] - [address] [facility] - [phone]  - [phone]   [address] [facility], [facility] [address]    Phone: [phone]   · azithromycin 250 MG tablet  · predniSONE 10 MG (21) dose pack          Anant Marvin PA  12/25/21 0527

## 2021-12-29 RX ORDER — DEXTROMETHORPHAN HYDROBROMIDE AND PROMETHAZINE HYDROCHLORIDE 15; 6.25 MG/5ML; MG/5ML
2.5 SYRUP ORAL 4 TIMES DAILY PRN
Qty: 120 ML | Refills: 0 | Status: SHIPPED | OUTPATIENT
Start: 2021-12-29 | End: 2022-11-22

## 2021-12-29 RX ORDER — DEXTROMETHORPHAN HYDROBROMIDE AND PROMETHAZINE HYDROCHLORIDE 15; 6.25 MG/5ML; MG/5ML
2.5 SYRUP ORAL 4 TIMES DAILY PRN
Qty: 120 ML | Refills: 0 | Status: SHIPPED | OUTPATIENT
Start: 2021-12-29 | End: 2021-12-29 | Stop reason: SDUPTHER

## 2022-05-17 ENCOUNTER — TELEPHONE (OUTPATIENT)
Dept: FAMILY MEDICINE CLINIC | Facility: CLINIC | Age: 27
End: 2022-05-17

## 2022-05-17 ENCOUNTER — TELEPHONE (OUTPATIENT)
Dept: OBSTETRICS AND GYNECOLOGY | Facility: CLINIC | Age: 27
End: 2022-05-17

## 2022-05-17 ENCOUNTER — LAB (OUTPATIENT)
Dept: OBSTETRICS AND GYNECOLOGY | Facility: CLINIC | Age: 27
End: 2022-05-17

## 2022-05-17 DIAGNOSIS — Z32.00 UNCONFIRMED PREGNANCY: Primary | ICD-10-CM

## 2022-05-17 DIAGNOSIS — B37.9 YEAST INFECTION: Primary | ICD-10-CM

## 2022-05-17 NOTE — TELEPHONE ENCOUNTER
----- Message from Layne Aguilar sent at 5/17/2022  9:54 AM EDT -----  Regarding: test  Giovanni Marcus,    I was wondering if you could order a blood pregnancy test for me?    I took test yesterday and they are positive.  Would you mind to do that please?    Thank you,

## 2022-05-17 NOTE — TELEPHONE ENCOUNTER
PT mother stated she will be coming in today to get blood work to check her hCG levels and check how far along she really is.

## 2022-05-17 NOTE — TELEPHONE ENCOUNTER
Spoke with pt and she states she has been having some vaginal itching, swelling, and dryness since yesterday. She denies any discharge or odor. She reports she did have a + UPT yesterday.     Per BILL Mcclain I will send in Miconazole 2% 7 days. Call back if symptoms worsen or do not improve    She VU

## 2022-05-17 NOTE — TELEPHONE ENCOUNTER
Patient is newly pregnant and said she is itchy and swollen, I set a new ob apt with brandon  But they have some concerns

## 2022-05-18 LAB — HCG INTACT+B SERPL-ACNC: NORMAL MIU/ML

## 2022-05-23 ENCOUNTER — TELEPHONE (OUTPATIENT)
Dept: OBSTETRICS AND GYNECOLOGY | Facility: CLINIC | Age: 27
End: 2022-05-23

## 2022-05-24 NOTE — TELEPHONE ENCOUNTER
Returned call to patient. Per pt she is experiencing heartburn and nausea. Would like to know what she can take. PT advised ok to tk Tums or Rolaids for heartburn. Take Vit B and Unisom for nausea. May tk 20 mg of each. Patient also advised to avoid trigger foods and monitor diet. Pt VU. She will call for any other questions or concerns. Pt request letter on mychart. Missed past 2 days of work due to being sick. Done.

## 2022-06-03 ENCOUNTER — INITIAL PRENATAL (OUTPATIENT)
Dept: OBSTETRICS AND GYNECOLOGY | Facility: CLINIC | Age: 27
End: 2022-06-03

## 2022-06-03 VITALS — SYSTOLIC BLOOD PRESSURE: 120 MMHG | WEIGHT: 293 LBS | BODY MASS INDEX: 57.33 KG/M2 | DIASTOLIC BLOOD PRESSURE: 86 MMHG

## 2022-06-03 DIAGNOSIS — Z3A.01 LESS THAN 8 WEEKS GESTATION OF PREGNANCY: ICD-10-CM

## 2022-06-03 DIAGNOSIS — Z34.90 PREGNANCY, UNSPECIFIED GESTATIONAL AGE: Primary | ICD-10-CM

## 2022-06-03 PROCEDURE — 99204 OFFICE O/P NEW MOD 45 MIN: CPT | Performed by: OBSTETRICS & GYNECOLOGY

## 2022-06-03 RX ORDER — ALBUTEROL SULFATE 90 UG/1
2 AEROSOL, METERED RESPIRATORY (INHALATION) EVERY 4 HOURS PRN
COMMUNITY
End: 2023-01-21 | Stop reason: HOSPADM

## 2022-06-03 RX ORDER — PRENATAL VIT NO.126/IRON/FOLIC 28MG-0.8MG
TABLET ORAL DAILY
COMMUNITY

## 2022-06-03 NOTE — PROGRESS NOTES
Initial ob visit     CC- Here for care of pregnancy        Layne Aguilar is a 26 y.o. female, , who presents for her first obstetrical visit.  Her last LMP was Patient's last menstrual period was 2022..    OB History    Para Term  AB Living   1             SAB IAB Ectopic Molar Multiple Live Births                    # Outcome Date GA Lbr Lennox/2nd Weight Sex Delivery Anes PTL Lv   1 Current                Initial positive test date : 2022, UPT          Prior obstetric issues, potential pregnancy concerns: none  Family history of genetic issues (includes FOB): none  Prior infections concerning in pregnancy (Rash, fever in last 2 weeks): none  Varicella Hx -no  Prior testing for Cystic Fibrosis Carrier or Sickle Cell Trait- none  Prepregnancy BMI - Body mass index is 57.33 kg/m².  History of STD: no  Ultrasound Today: Yes.  Findings showed nl 7w4d.  I have personally evaluated the U/S and agree with the findings. Mukesh Henry MD    Additional Pertinent History   Last Pap :   Last Completed Pap Smear     This patient has no relevant Health Maintenance data.        History of abnormal Pap smear: no  Family history of uterine, colon, breast, or ovarian cancer: no  Feelings of Anxiety or Depression: no  Tobacco Usage?: No   Alcohol/Drug Use?: yes admits to marijuana use early pregnancy  Over the age of 35 at delivery: no  Desires Genetic Screening: discuss options    PMH  Past Medical History:   Diagnosis Date   • Acute maxillary sinusitis    • Acute pharyngitis    • Fatigue    • History of pneumonia    • Knee pain    • PCOS (polycystic ovarian syndrome)        Current Outpatient Medications:   •  albuterol sulfate  (90 Base) MCG/ACT inhaler, Inhale 2 puffs Every 4 (Four) Hours As Needed for Wheezing., Disp: , Rfl:   •  prenatal vitamin (prenatal, CLASSIC, vitamin) tablet, Take  by mouth Daily., Disp: , Rfl:   •  azithromycin (ZITHROMAX) 250 MG tablet, Take 1 tablet by mouth Daily. Take  2 tablets the first day, then 1 tablet daily for 4 days., Disp: 6 tablet, Rfl: 0  •  fluticasone (Flonase) 50 MCG/ACT nasal spray, 2 sprays into the nostril(s) as directed by provider Daily., Disp: 16 mL, Rfl: 0  •  miconazole (MICOTIN) 2 % vaginal cream, Insert 1 applicator into the vagina Every Night., Disp: 45 g, Rfl: 0  •  phentermine (ADIPEX-P) 37.5 MG tablet, Take 1 tablet by mouth Every Morning Before Breakfast., Disp: 30 tablet, Rfl: 3  •  predniSONE (DELTASONE) 10 MG (21) dose pack, Use as directed on package, Disp: 21 tablet, Rfl: 0  •  promethazine-dextromethorphan (PROMETHAZINE-DM) 6.25-15 MG/5ML syrup, Take 2.5 mL by mouth 4 (Four) Times a Day As Needed for Cough., Disp: 120 mL, Rfl: 0    The additional following portions of the patient's history were reviewed and updated as appropriate: allergies, current medications, past family history, past medical history, past social history and past surgical history.    Review of Systems   Review of Systems  Current obstetric complaints : Nausea and occasional vomiting, heartburn   All systems reviewed and otherwise normal.    I have reviewed and agree with the HPI, ROS, and historical information as entered above. Mukesh Henry MD    /86   Wt (!) 152 kg (334 lb)   LMP 04/05/2022   BMI 57.33 kg/m²     Physical Exam  General:  well developed; well nourished  no acute distress   Chest/Respiratory: No labored breathing, normal respiratory effort, normal appearance, no respiratory noises noted   Heart:  normal rate, regular rhythm,  no murmurs, rubs, or gallops   Thyroid: normal to inspection and palpation   Breasts:  Not performed.   Abdomen: soft, non-tender; no masses  no umbilical or inguinal hernias are present  no hepato-splenomegaly   Pelvis: Not performed.  External genitalia:  normal appearance of the external genitalia including Bartholin's and [place]'s glands.  :  urethral meatus normal;  Vaginal:  normal pink mucosa without prolapse or  lesions.  Cervix:  normal appearance.  Uterus:  normal size, shape and consistency.  Adnexa:  normal bimanual exam of the adnexa.        Assessment and Plan    Problem List Items Addressed This Visit    None     Visit Diagnoses     Pregnancy, unspecified gestational age    -  Primary    Relevant Orders    US Ob Transvaginal (Completed)    Less than 8 weeks gestation of pregnancy        Relevant Orders    Obstetric Panel    Chlamydia trachomatis, Neisseria gonorrhoeae, PCR w/ confirmation - Urine, Urine, Clean Catch    HIV-1 / O / 2 Ag / Antibody 4th Generation    Urinalysis With Microscopic - Urine, Clean Catch    Urine Culture - Urine, Urine, Clean Catch    Urine Drug Screen - Urine, Clean Catch          1. Pregnancy at 7w4d  2. Reviewed routine prenatal care with the office and educational materials given  3. Lab(s) Ordered  4. Discussed options for genetic testing including first trimester nuchal translucency screen, genetic disease carrier testing, quadruple screen, and [place].  5. Patient is on Prenatal vitamins  6. will do pap next  Return in about 1 month (around 7/3/2022).      Mukesh Henry MD  06/03/2022

## 2022-06-08 LAB
ABO GROUP BLD: ABNORMAL
AMPHETAMINES UR QL SCN: NEGATIVE NG/ML
APPEARANCE UR: ABNORMAL
BACTERIA #/AREA URNS HPF: ABNORMAL /[HPF]
BACTERIA UR CULT: NORMAL
BACTERIA UR CULT: NORMAL
BARBITURATES UR QL SCN: NEGATIVE NG/ML
BASOPHILS # BLD AUTO: 0.1 X10E3/UL (ref 0–0.2)
BASOPHILS NFR BLD AUTO: 0 %
BENZODIAZ UR QL SCN: NEGATIVE NG/ML
BILIRUB UR QL STRIP: NEGATIVE
BLD GP AB SCN SERPL QL: NEGATIVE
BZE UR QL SCN: NEGATIVE NG/ML
C TRACH RRNA SPEC QL NAA+PROBE: NEGATIVE
CANNABINOIDS UR QL SCN: POSITIVE NG/ML
CASTS URNS QL MICRO: ABNORMAL /LPF
COLOR UR: YELLOW
CREAT UR-MCNC: 419.7 MG/DL (ref 20–300)
EOSINOPHIL # BLD AUTO: 0.1 X10E3/UL (ref 0–0.4)
EOSINOPHIL NFR BLD AUTO: 1 %
EPI CELLS #/AREA URNS HPF: >10 /HPF (ref 0–10)
ERYTHROCYTE [DISTWIDTH] IN BLOOD BY AUTOMATED COUNT: 12.5 % (ref 11.7–15.4)
GLUCOSE UR QL STRIP: NEGATIVE
HBV SURFACE AG SERPL QL IA: NEGATIVE
HCT VFR BLD AUTO: 43.2 % (ref 34–46.6)
HCV AB S/CO SERPL IA: 0.5 S/CO RATIO (ref 0–0.9)
HGB BLD-MCNC: 14.1 G/DL (ref 11.1–15.9)
HGB UR QL STRIP: NEGATIVE
HIV 1+2 AB+HIV1 P24 AG SERPL QL IA: NON REACTIVE
IMM GRANULOCYTES # BLD AUTO: 0.1 X10E3/UL (ref 0–0.1)
IMM GRANULOCYTES NFR BLD AUTO: 1 %
KETONES UR QL STRIP: ABNORMAL
LABORATORY COMMENT REPORT: ABNORMAL
LEUKOCYTE ESTERASE UR QL STRIP: NEGATIVE
LYMPHOCYTES # BLD AUTO: 2.2 X10E3/UL (ref 0.7–3.1)
LYMPHOCYTES NFR BLD AUTO: 17 %
MCH RBC QN AUTO: 28.4 PG (ref 26.6–33)
MCHC RBC AUTO-ENTMCNC: 32.6 G/DL (ref 31.5–35.7)
MCV RBC AUTO: 87 FL (ref 79–97)
METHADONE UR QL SCN: NEGATIVE NG/ML
MICRO URNS: ABNORMAL
MONOCYTES # BLD AUTO: 0.8 X10E3/UL (ref 0.1–0.9)
MONOCYTES NFR BLD AUTO: 6 %
N GONORRHOEA RRNA SPEC QL NAA+PROBE: NEGATIVE
NEUTROPHILS # BLD AUTO: 9.3 X10E3/UL (ref 1.4–7)
NEUTROPHILS NFR BLD AUTO: 75 %
NITRITE UR QL STRIP: NEGATIVE
OPIATES UR QL SCN: NEGATIVE NG/ML
OXYCODONE+OXYMORPHONE UR QL SCN: NEGATIVE NG/ML
PCP UR QL: NEGATIVE NG/ML
PH UR STRIP: 6.5 [PH] (ref 5–7.5)
PH UR: 7.9 [PH] (ref 4.5–8.9)
PLATELET # BLD AUTO: 251 X10E3/UL (ref 150–450)
PROPOXYPH UR QL SCN: NEGATIVE NG/ML
PROT UR QL STRIP: ABNORMAL
RBC # BLD AUTO: 4.97 X10E6/UL (ref 3.77–5.28)
RBC #/AREA URNS HPF: ABNORMAL /HPF (ref 0–2)
RH BLD: NEGATIVE
RPR SER QL: NON REACTIVE
RUBV IGG SERPL IA-ACNC: 7.39 INDEX
SP GR UR STRIP: >=1.03 (ref 1–1.03)
UROBILINOGEN UR STRIP-MCNC: 0.2 MG/DL (ref 0.2–1)
WBC # BLD AUTO: 12.5 X10E3/UL (ref 3.4–10.8)
WBC #/AREA URNS HPF: ABNORMAL /HPF (ref 0–5)

## 2022-06-15 ENCOUNTER — TELEPHONE (OUTPATIENT)
Dept: OBSTETRICS AND GYNECOLOGY | Facility: CLINIC | Age: 27
End: 2022-06-15

## 2022-06-15 DIAGNOSIS — B37.9 YEAST INFECTION: ICD-10-CM

## 2022-06-15 NOTE — TELEPHONE ENCOUNTER
PT STATED SHE IS HAVING VAGINAL ITCHING DRYNESS AND DISCOMFORT PT STATED NO PAIN CURRENTLY PT DECLINED TO MAKE AN APPT AND ASKED TO SPEAK TO CLINICAL STAFF

## 2022-06-15 NOTE — TELEPHONE ENCOUNTER
Dr. Henry OB patient   9w2d    S/w patient- patient states that she has vaginal itching that began last PM. Patient denies vaginal odor or vaginal discharge. Patient states that she recently changed soaps and wearing underwear. Encouraged patient to switch back to what her body is use to as this could be causing this discomfort. Patient v/u.    Per Santy- send in Miconazole cream. Pharmacy verified and Rx sent.

## 2022-07-01 ENCOUNTER — ROUTINE PRENATAL (OUTPATIENT)
Dept: OBSTETRICS AND GYNECOLOGY | Facility: CLINIC | Age: 27
End: 2022-07-01

## 2022-07-01 VITALS — SYSTOLIC BLOOD PRESSURE: 118 MMHG | WEIGHT: 293 LBS | DIASTOLIC BLOOD PRESSURE: 78 MMHG | BODY MASS INDEX: 57.5 KG/M2

## 2022-07-01 DIAGNOSIS — Z3A.11 11 WEEKS GESTATION OF PREGNANCY: Primary | ICD-10-CM

## 2022-07-01 LAB
GLUCOSE UR STRIP-MCNC: NEGATIVE MG/DL
PROT UR STRIP-MCNC: NEGATIVE MG/DL

## 2022-07-01 PROCEDURE — 99212 OFFICE O/P EST SF 10 MIN: CPT | Performed by: OBSTETRICS & GYNECOLOGY

## 2022-07-01 NOTE — PROGRESS NOTES
OB FOLLOW UP  CC- Here for care of pregnancy        Layne Aguilar is a 27 y.o.  11w4d patient being seen today for her obstetrical follow up visit. Patient reports nausea and vomiting. Performing PAP today from NOB.    Her prenatal care is complicated by (and status) :    Patient Active Problem List   Diagnosis   • Dysmenorrhea   • Umbilical hernia   • Premenopausal menorrhagia   • Chronic rhinitis   • Morbid obesity (HCC)   • Allergic rhinitis   • Abscess of abdominal wall   • Acute vaginitis   • Epidermoid cyst   • Otitis media   • Pain in left knee   • Urinary tract infectious disease   • Viral hepatitis C       Ultrasound Today: No.    ROS -   Patient Reports : Nausea and Vomiting. She reports vomiting 2-3 times per week  Patient Denies: Loss of Fluid, Vaginal Spotting, Vision Changes, Headaches and Epigastric pain  Fetal Movement : N/A  All other systems reviewed and are negative.       The additional following portions of the patient's history were reviewed and updated as appropriate: allergies, current medications, past family history, past medical history, past social history, past surgical history and problem list.    I have reviewed and agree with the HPI, ROS, and historical information as entered above. Mukesh Henry MD    /78   Wt (!) 152 kg (335 lb)   LMP 2022   BMI 57.50 kg/m²       EXAM:     FHT: 161 BPM   Uterine Size: size equals dates  Pelvic Exam: Yes pap done     Urine glucose/protein: See prenatal flowsheet       Assessment and Plan    Problem List Items Addressed This Visit    None     Visit Diagnoses     11 weeks gestation of pregnancy    -  Primary    Relevant Orders    POC Urinalysis Dipstick (Completed)    LIQUID-BASED PAP SMEAR, [facility] ([facility])          1. Pregnancy at 11w4d  2. Fetal status reassuring.   3. Activity and Exercise discussed.  Return in about 1 month (around 2022).    Mukesh Henry MD  2022

## 2022-07-06 LAB — REF LAB TEST METHOD: NORMAL

## 2022-07-11 ENCOUNTER — TELEPHONE (OUTPATIENT)
Dept: OBSTETRICS AND GYNECOLOGY | Facility: CLINIC | Age: 27
End: 2022-07-11

## 2022-07-11 DIAGNOSIS — B37.9 YEAST INFECTION: ICD-10-CM

## 2022-07-11 NOTE — TELEPHONE ENCOUNTER
S/w pt she states she previously had a yeast infection and used all of her miconazole cream and states her s/s returned yesterday and was wanting to get a RF on this. I told patient we could send in a refill and if her s/s do not improve or they worsen to CB for further evaluation. She v/u.     S/s are : Vaginal itching, discharge that is white     Denies vaginal odor    Medication sent to pharmacy

## 2022-07-11 NOTE — TELEPHONE ENCOUNTER
Pt is 13 wks pregnant and has been have reoccurring yeast infections.    Would like to speak to nurse. Please advise

## 2022-07-26 ENCOUNTER — ROUTINE PRENATAL (OUTPATIENT)
Dept: OBSTETRICS AND GYNECOLOGY | Facility: CLINIC | Age: 27
End: 2022-07-26

## 2022-07-26 VITALS — BODY MASS INDEX: 52.87 KG/M2 | WEIGHT: 293 LBS | SYSTOLIC BLOOD PRESSURE: 118 MMHG | DIASTOLIC BLOOD PRESSURE: 80 MMHG

## 2022-07-26 DIAGNOSIS — Z3A.15 15 WEEKS GESTATION OF PREGNANCY: Primary | ICD-10-CM

## 2022-07-26 LAB
GLUCOSE UR STRIP-MCNC: NEGATIVE MG/DL
PROT UR STRIP-MCNC: ABNORMAL MG/DL

## 2022-07-26 PROCEDURE — 99212 OFFICE O/P EST SF 10 MIN: CPT | Performed by: OBSTETRICS & GYNECOLOGY

## 2022-07-26 NOTE — PROGRESS NOTES
OB FOLLOW UP  CC- Here for care of pregnancy        Layne Aguilar is a 27 y.o.  15w1d patient being seen today for her obstetrical follow up visit. Patient reports nausea, pt states been having recurrent yeast infections      Her prenatal care is complicated by (and status) : None  Patient Active Problem List   Diagnosis   • Dysmenorrhea   • Umbilical hernia   • Premenopausal menorrhagia   • Chronic rhinitis   • Morbid obesity (HCC)   • Allergic rhinitis   • Abscess of abdominal wall   • Acute vaginitis   • Epidermoid cyst   • Otitis media   • Pain in left knee   • Urinary tract infectious disease   • Viral hepatitis C       Flu Status: Already given in current flu season  Ultrasound Today: No    AFP: declines    ROS -   Patient Reports : Nausea  Patient Denies: Loss of Fluid, Vaginal Spotting, Vision Changes, Headaches and Contractions  Fetal Movement : to early  All other systems reviewed and are negative.       The additional following portions of the patient's history were reviewed and updated as appropriate: allergies and current medications.    I have reviewed and agree with the HPI, ROS, and historical information as entered above. Mukesh Henry MD        EXAM:     Prenatal Vitals  BP: 118/80  Weight: (!) 140 kg (308 lb)   Fetal Heart Rate: 144   Pelvic Exam:        Urine Glucose Read-only: Negative  Urine Protein Read-only: (!) Trace           Assessment and Plan    Problem List Items Addressed This Visit    None     Visit Diagnoses     15 weeks gestation of pregnancy    -  Primary    Relevant Orders    POC Urinalysis Dipstick (Completed)    US Ob 14 + Weeks Single or First Gestation          1. Pregnancy at 15w1d  2. Fetal status reassuring.   3. Counseled on MSAFP alone in relation to OTD and placental issues.    4. Anatomy scan next visit.   5. Activity and Exercise discussed.  6. U/S ordered at follow up  7. Patient is on Prenatal vitamins  Return in about 5 weeks (around 2022) for  us.    Mukesh Henry MD  07/26/2022

## 2022-08-29 ENCOUNTER — HOSPITAL ENCOUNTER (EMERGENCY)
Facility: HOSPITAL | Age: 27
Discharge: HOME | End: 2022-08-29
Attending: EMERGENCY MEDICINE | Admitting: EMERGENCY MEDICINE

## 2022-08-29 VITALS
WEIGHT: 293 LBS | HEART RATE: 91 BPM | OXYGEN SATURATION: 100 % | TEMPERATURE: 98.7 F | RESPIRATION RATE: 20 BRPM | SYSTOLIC BLOOD PRESSURE: 125 MMHG | BODY MASS INDEX: 53.92 KG/M2 | HEIGHT: 62 IN | DIASTOLIC BLOOD PRESSURE: 65 MMHG

## 2022-08-29 DIAGNOSIS — Z3A.20 20 WEEKS GESTATION OF PREGNANCY: ICD-10-CM

## 2022-08-29 DIAGNOSIS — J02.9 VIRAL PHARYNGITIS: Primary | ICD-10-CM

## 2022-08-29 LAB
FLUAV SUBTYP SPEC NAA+PROBE: NOT DETECTED
FLUBV RNA ISLT QL NAA+PROBE: NOT DETECTED
S PYO AG THROAT QL: NEGATIVE
SARS-COV-2 RNA PNL SPEC NAA+PROBE: NOT DETECTED

## 2022-08-29 PROCEDURE — 87880 STREP A ASSAY W/OPTIC: CPT | Performed by: PHYSICIAN ASSISTANT

## 2022-08-29 PROCEDURE — 99283 EMERGENCY DEPT VISIT LOW MDM: CPT

## 2022-08-29 PROCEDURE — 87636 SARSCOV2 & INF A&B AMP PRB: CPT | Performed by: PHYSICIAN ASSISTANT

## 2022-08-29 PROCEDURE — 87081 CULTURE SCREEN ONLY: CPT | Performed by: PHYSICIAN ASSISTANT

## 2022-08-30 ENCOUNTER — ROUTINE PRENATAL (OUTPATIENT)
Dept: OBSTETRICS AND GYNECOLOGY | Facility: CLINIC | Age: 27
End: 2022-08-30

## 2022-08-30 VITALS — WEIGHT: 293 LBS | SYSTOLIC BLOOD PRESSURE: 142 MMHG | BODY MASS INDEX: 62.19 KG/M2 | DIASTOLIC BLOOD PRESSURE: 80 MMHG

## 2022-08-30 DIAGNOSIS — Z3A.20 20 WEEKS GESTATION OF PREGNANCY: Primary | ICD-10-CM

## 2022-08-30 DIAGNOSIS — Z3A.15 15 WEEKS GESTATION OF PREGNANCY: ICD-10-CM

## 2022-08-30 LAB
EXPIRATION DATE: 0
GLUCOSE UR STRIP-MCNC: NEGATIVE MG/DL
Lab: 0
PROT UR STRIP-MCNC: ABNORMAL MG/DL

## 2022-08-30 PROCEDURE — 76805 OB US >/= 14 WKS SNGL FETUS: CPT | Performed by: OBSTETRICS & GYNECOLOGY

## 2022-08-30 PROCEDURE — 99212 OFFICE O/P EST SF 10 MIN: CPT | Performed by: OBSTETRICS & GYNECOLOGY

## 2022-08-30 NOTE — PROGRESS NOTES
OB FOLLOW UP  CC- Here for care of pregnancy        Layne Aguilar is a 27 y.o.  20w1d patient being seen today for her obstetrical follow up visit. Patient reports no complaints..     Anomaly scan today.     Her prenatal care is complicated by (and status) : None  Patient Active Problem List   Diagnosis   • Dysmenorrhea   • Umbilical hernia   • Premenopausal menorrhagia   • Chronic rhinitis   • Morbid obesity (HCC)   • Allergic rhinitis   • Abscess of abdominal wall   • Acute vaginitis   • Epidermoid cyst   • Otitis media   • Pain in left knee   • Urinary tract infectious disease   • Viral hepatitis C     Ultrasound Today: Yes    ROS -   Patient Reports : No Problems  Patient Denies: Loss of Fluid, Vaginal Spotting, Vision Changes, Headaches, Nausea  and Vomiting   Fetal Movement : normal  All other systems reviewed and are negative.       The additional following portions of the patient's history were reviewed and updated as appropriate: allergies and current medications.    I have reviewed and agree with the HPI, ROS, and historical information as entered above. Mukesh Henry MD    /80   Wt (!) 154 kg (340 lb)   LMP 2022   BMI 62.19 kg/m²       EXAM:     Prenatal Vitals  BP: 142/80  Weight: (!) 154 kg (340 lb)   Fetal Heart Rate: 148          Urine Glucose Read-only: Negative  Urine Protein Read-only: (!) Trace       Assessment and Plan    Problem List Items Addressed This Visit    None     Visit Diagnoses     20 weeks gestation of pregnancy    -  Primary    Relevant Orders    POC Glucose, Urine, Qualitative, Dipstick (Completed)    POC Protein, Urine, Qualitative, Dipstick (Completed)    [id number] PLUS Core - Blood,    [id number] PLUS Core - Blood,    15 weeks gestation of pregnancy              1. Pregnancy at 20w1d  2. Anatomy scan today is incomplete, follow up in 4 weeks for additional views. Anatomy that was visualized was within normal limits.  3. Fetal status reassuring.    4. Activity and Exercise discussed.  5. Patient is on Prenatal vitamins  Return in about 1 month (around 9/30/2022) for us.    Mukesh Henry MD  08/30/2022

## 2022-08-31 LAB — BACTERIA SPEC AEROBE CULT: NORMAL

## 2022-09-04 LAB
CFDNA.FET/CFDNA.TOTAL SFR FETUS: NORMAL %
CITATION REF LAB TEST: NORMAL
FET 13+18+21+X+Y ANEUP PLAS.CFDNA: NEGATIVE
FET CHR 21 TS PLAS.CFDNA QL: NEGATIVE
FET SEX PLAS.CFDNA DOSAGE CFDNA: NORMAL
FET TS 13 RISK PLAS.CFDNA QL: NEGATIVE
FET TS 18 RISK WBC.DNA+CFDNA QL: NEGATIVE
GA EST FROM CONCEPTION DATE: NORMAL D
GESTATIONAL AGE > 9:: YES
LAB DIRECTOR NAME PROVIDER: NORMAL
LAB DIRECTOR NAME PROVIDER: NORMAL
LABORATORY COMMENT REPORT: NORMAL
LIMITATIONS OF THE TEST: NORMAL
NEGATIVE PREDICTIVE VALUE: NORMAL
NOTE: NORMAL
PERFORMANCE CHARACTERISTICS: NORMAL
POSITIVE PREDICTIVE VALUE: NORMAL
REF LAB TEST METHOD: NORMAL
TEST PERFORMANCE INFO SPEC: NORMAL

## 2022-09-27 ENCOUNTER — ROUTINE PRENATAL (OUTPATIENT)
Dept: OBSTETRICS AND GYNECOLOGY | Facility: CLINIC | Age: 27
End: 2022-09-27

## 2022-09-27 VITALS — DIASTOLIC BLOOD PRESSURE: 86 MMHG | WEIGHT: 293 LBS | BODY MASS INDEX: 62.08 KG/M2 | SYSTOLIC BLOOD PRESSURE: 114 MMHG

## 2022-09-27 DIAGNOSIS — Z3A.24 24 WEEKS GESTATION OF PREGNANCY: Primary | ICD-10-CM

## 2022-09-27 LAB
EXPIRATION DATE: 0
GLUCOSE UR STRIP-MCNC: NEGATIVE MG/DL
Lab: 0
PROT UR STRIP-MCNC: NEGATIVE MG/DL

## 2022-09-27 PROCEDURE — 0502F SUBSEQUENT PRENATAL CARE: CPT | Performed by: OBSTETRICS & GYNECOLOGY

## 2022-09-27 NOTE — PROGRESS NOTES
OB FOLLOW UP  CC- Here for care of pregnancy        Layne Aguilar is a 27 y.o.  24w1d patient being seen today for her obstetrical follow up visit. Patient reports occasional slight swelling in feet when she is on them a while.     28 week instruction sheet given to patient.     Her prenatal care is complicated by (and status) : None  Patient Active Problem List   Diagnosis   • Dysmenorrhea   • Umbilical hernia   • Premenopausal menorrhagia   • Chronic rhinitis   • Morbid obesity (HCC)   • Allergic rhinitis   • Abscess of abdominal wall   • Acute vaginitis   • Epidermoid cyst   • Otitis media   • Pain in left knee   • Urinary tract infectious disease   • Viral hepatitis C       Flu Status: Desires at future appt  Ultrasound Today: Yes    ROS -   Patient Reports : No Problems  Patient Denies: Loss of Fluid, Vaginal Spotting, Vision Changes, Headaches, Nausea , Vomiting , Contractions and Epigastric pain  Fetal Movement : normal  All other systems reviewed and are negative.       The additional following portions of the patient's history were reviewed and updated as appropriate: allergies and current medications.    I have reviewed and agree with the HPI, ROS, and historical information as entered above. Mukesh Henry MD    /86   Wt (!) 154 kg (339 lb 6.4 oz)   LMP 2022   BMI 62.08 kg/m²       EXAM:     Prenatal Vitals  BP: 114/86  Weight: (!) 154 kg (339 lb 6.4 oz)   Fetal Heart Rate: 144 US       Fundal Height (cm): 144 cm        Urine Glucose Read-only: Negative  Urine Protein Read-only: Negative       Assessment and Plan    Problem List Items Addressed This Visit    None     Visit Diagnoses     24 weeks gestation of pregnancy    -  Primary    Relevant Orders    POC Protein, Urine, Qualitative, Dipstick (Completed)    POC Glucose, Urine, Qualitative, Dipstick (Completed)    [facility]          1. Pregnancy at 24w1d  2. Fetal status reassuring.  3. anatomy scan  incomplete. and needs PDC appt sent today  4. 1 hour gtt, CBC, Antibody screen and TDAP next visit. Instructions given  5. Discussed/encouraged TDAP vaccination after 28 weeks  6. PDC appt sent   7. Activity and Exercise discussed.  Return in about 1 month (around 10/27/2022) for PDC appt and BS here 1 month .    Mukesh Henry MD  09/27/2022

## 2022-10-03 ENCOUNTER — OFFICE VISIT (OUTPATIENT)
Dept: OBSTETRICS AND GYNECOLOGY | Facility: HOSPITAL | Age: 27
End: 2022-10-03

## 2022-10-03 ENCOUNTER — REFERRAL TRIAGE (OUTPATIENT)
Dept: LABOR AND DELIVERY | Facility: HOSPITAL | Age: 27
End: 2022-10-03

## 2022-10-03 ENCOUNTER — HOSPITAL ENCOUNTER (OUTPATIENT)
Dept: WOMENS IMAGING | Facility: HOSPITAL | Age: 27
Discharge: HOME | End: 2022-10-03
Admitting: OBSTETRICS & GYNECOLOGY

## 2022-10-03 VITALS — DIASTOLIC BLOOD PRESSURE: 58 MMHG | SYSTOLIC BLOOD PRESSURE: 104 MMHG | WEIGHT: 293 LBS | BODY MASS INDEX: 62.55 KG/M2

## 2022-10-03 DIAGNOSIS — Z3A.24 24 WEEKS GESTATION OF PREGNANCY: ICD-10-CM

## 2022-10-03 DIAGNOSIS — Z3A.25 25 WEEKS GESTATION OF PREGNANCY: ICD-10-CM

## 2022-10-03 DIAGNOSIS — E66.01 MORBID OBESITY WITH BMI OF 60.0-69.9, ADULT: Primary | ICD-10-CM

## 2022-10-03 PROBLEM — Z34.80 SUPERVISION OF OTHER NORMAL PREGNANCY, ANTEPARTUM: Status: ACTIVE | Noted: 2022-10-03

## 2022-10-03 PROCEDURE — 76811 OB US DETAILED SNGL FETUS: CPT

## 2022-10-03 PROCEDURE — 76811 OB US DETAILED SNGL FETUS: CPT | Performed by: OBSTETRICS & GYNECOLOGY

## 2022-10-03 NOTE — PROGRESS NOTES
Patient seen in Maternal Fetal Medicine clinic today. Please see full note in under imaging tab of patient chart in Epic (Viewpoint report).    Bhavya Quintana MD

## 2022-10-19 ENCOUNTER — PATIENT OUTREACH (OUTPATIENT)
Dept: LABOR AND DELIVERY | Facility: HOSPITAL | Age: 27
End: 2022-10-19

## 2022-10-19 NOTE — OUTREACH NOTE
[facility]  Enrollment    Current Estimated Gestational Age: 27w2d    Questions/Answers    Flowsheet Row Responses   Would like to participate? Yes   Date of Intake Visit 10/20/22            Joslyn Gallego, RN  Maternity Nurse Navigator    10/19/2022, 16:38 EDT

## 2022-10-20 ENCOUNTER — PATIENT OUTREACH (OUTPATIENT)
Dept: LABOR AND DELIVERY | Facility: HOSPITAL | Age: 27
End: 2022-10-20

## 2022-10-20 NOTE — OUTREACH NOTE
[facility]  Unable to Reach       Questions/Answers    Flowsheet Row Responses   Pending Outreach Intake Visit   Call Attempt First   Outcome No answer/busy, Left message   Next Call Attempt Date 10/25/22            Joslyn Gallego, RN  Maternity Nurse Navigator    10/20/2022, 17:31 EDT

## 2022-10-25 ENCOUNTER — PATIENT OUTREACH (OUTPATIENT)
Dept: LABOR AND DELIVERY | Facility: HOSPITAL | Age: 27
End: 2022-10-25

## 2022-10-25 ENCOUNTER — ROUTINE PRENATAL (OUTPATIENT)
Dept: OBSTETRICS AND GYNECOLOGY | Facility: CLINIC | Age: 27
End: 2022-10-25

## 2022-10-25 VITALS — BODY MASS INDEX: 62.37 KG/M2 | SYSTOLIC BLOOD PRESSURE: 112 MMHG | WEIGHT: 293 LBS | DIASTOLIC BLOOD PRESSURE: 80 MMHG

## 2022-10-25 DIAGNOSIS — E66.01 MORBID OBESITY WITH BMI OF 60.0-69.9, ADULT: ICD-10-CM

## 2022-10-25 DIAGNOSIS — Z23 IMMUNIZATION DUE: ICD-10-CM

## 2022-10-25 DIAGNOSIS — Z67.91 RH NEGATIVE STATUS DURING PREGNANCY IN SECOND TRIMESTER: ICD-10-CM

## 2022-10-25 DIAGNOSIS — Z3A.28 28 WEEKS GESTATION OF PREGNANCY: ICD-10-CM

## 2022-10-25 DIAGNOSIS — Z13.1 SCREENING FOR DIABETES MELLITUS: Primary | ICD-10-CM

## 2022-10-25 DIAGNOSIS — O26.892 RH NEGATIVE STATUS DURING PREGNANCY IN SECOND TRIMESTER: ICD-10-CM

## 2022-10-25 LAB
GLUCOSE UR STRIP-MCNC: NEGATIVE MG/DL
PROT UR STRIP-MCNC: NEGATIVE MG/DL

## 2022-10-25 PROCEDURE — 90715 TDAP VACCINE 7 YRS/> IM: CPT | Performed by: NURSE PRACTITIONER

## 2022-10-25 PROCEDURE — 0502F SUBSEQUENT PRENATAL CARE: CPT | Performed by: NURSE PRACTITIONER

## 2022-10-25 PROCEDURE — 90686 IIV4 VACC NO PRSV 0.5 ML IM: CPT | Performed by: NURSE PRACTITIONER

## 2022-10-25 PROCEDURE — 90471 IMMUNIZATION ADMIN: CPT | Performed by: NURSE PRACTITIONER

## 2022-10-25 PROCEDURE — 96372 THER/PROPH/DIAG INJ SC/IM: CPT | Performed by: NURSE PRACTITIONER

## 2022-10-25 PROCEDURE — 90472 IMMUNIZATION ADMIN EACH ADD: CPT | Performed by: NURSE PRACTITIONER

## 2022-10-25 NOTE — OUTREACH NOTE
[facility]  Unable to Reach       Questions/Answers    Flowsheet Row Responses   Pending Outreach Intake Visit   Call Attempt Second   Outcome No answer/busy, Left message   Next Call Attempt Date 11/07/22          Contact information provided.    Joslyn Gallego RN  Maternity Nurse Navigator    10/25/2022, 14:31 EDT

## 2022-10-25 NOTE — PROGRESS NOTES
OB FOLLOW UP  CC- Here for care of pregnancy        Layne Aguilar is a 27 y.o.  28w1d patient being seen today for her obstetrical follow up. Patient reports no complaints..     Patient undergoing Glucola testing today. She is due for her testing at 1100.       MBT: O negative,  Rhogam given today  28 week packet: provided  TDAP: given today  Flu Status: given today  Ultrasound Today: No    Her prenatal care is complicated by (and status) :  None  Patient Active Problem List   Diagnosis   • Dysmenorrhea   • Umbilical hernia   • Premenopausal menorrhagia   • Chronic rhinitis   • Morbid obesity (HCC)   • Allergic rhinitis   • Abscess of abdominal wall   • Acute vaginitis   • Epidermoid cyst   • Otitis media   • Pain in left knee   • Urinary tract infectious disease   • Viral hepatitis C   • Morbid obesity with BMI of 60.0-69.9, adult (HCC)   • Supervision of other normal pregnancy, antepartum         ROS -   Patient Reports : No Problems  Patient Denies: Loss of Fluid, Vaginal Spotting, Vision Changes, Headaches, Nausea , Vomiting  and Contractions  Fetal Movement : normal    The additional following portions of the patient's history were reviewed and updated as appropriate: allergies, current medications, past family history, past medical history, past social history, past surgical history and problem list.    I have reviewed and agree with the HPI, ROS, and historical information as entered above. Rose Lyons, APRN    /80   Wt (!) 155 kg (341 lb)   LMP 2022   BMI 62.37 kg/m²         EXAM:     Prenatal Vitals  BP: 112/80  Weight: (!) 155 kg (341 lb)   Fetal Heart Rate: 140               Urine Glucose Read-only: Negative  Urine Protein Read-only: Negative       Assessment and Plan    Problem List Items Addressed This Visit        Endocrine and Metabolic    Morbid obesity with BMI of 60.0-69.9, adult ([facility])   Other Visit Diagnoses     Screening for diabetes mellitus    -  Primary     Relevant Orders    Antibody Screen (Completed)    CBC (No Diff) (Completed)    Gestational Screen 1 Hr ([facility]) (Completed)    Rh negative status during pregnancy in second trimester        Relevant Orders    Rhogam Immune Globulin Immunization (Completed)    28 weeks gestation of pregnancy        Relevant Orders    POC Urinalysis Dipstick (Completed)    Immunization due        Relevant Orders    FluLaval/Fluzone >6 mos (3107-5195) (Completed)    Tdap Vaccine Greater Than or Equal To 8yo IM (Completed)          1. Pregnancy at 28w1d  2. 1 hr Glucola, CBC, and antibody screen today  and TDAP given today  3. Fetal movement/PTL or Labor precautions  4. Activity and Exercise discussed.  Tdap and flu vaccines given today  MBT= O negative, Rhogam given today  RTC in 4 weeks with Dr.Parrott Rose Lyons, APRN  10/25/2022

## 2022-10-26 LAB
BLD GP AB SCN SERPL QL: NEGATIVE
ERYTHROCYTE [DISTWIDTH] IN BLOOD BY AUTOMATED COUNT: 12.4 % (ref 12.3–15.4)
GLUCOSE 1H P 50 G GLC PO SERPL-MCNC: 148 MG/DL (ref 65–139)
HCT VFR BLD AUTO: 35.5 % (ref 34–46.6)
HGB BLD-MCNC: 11.5 G/DL (ref 12–15.9)
MCH RBC QN AUTO: 28.4 PG (ref 26.6–33)
MCHC RBC AUTO-ENTMCNC: 32.4 G/DL (ref 31.5–35.7)
MCV RBC AUTO: 87.7 FL (ref 79–97)
PLATELET # BLD AUTO: 246 10*3/MM3 (ref 140–450)
RBC # BLD AUTO: 4.05 10*6/MM3 (ref 3.77–5.28)
WBC # BLD AUTO: 11.77 10*3/MM3 (ref 3.4–10.8)

## 2022-11-01 ENCOUNTER — LAB (OUTPATIENT)
Dept: OBSTETRICS AND GYNECOLOGY | Facility: CLINIC | Age: 27
End: 2022-11-01

## 2022-11-01 DIAGNOSIS — Z3A.29 29 WEEKS GESTATION OF PREGNANCY: Primary | ICD-10-CM

## 2022-11-02 LAB
GLUCOSE 1H P 100 G GLC PO SERPL-MCNC: 156 MG/DL (ref 65–179)
GLUCOSE 2H P 100 G GLC PO SERPL-MCNC: 89 MG/DL (ref 65–154)
GLUCOSE 3H P 100 G GLC PO SERPL-MCNC: 75 MG/DL (ref 65–139)
GLUCOSE P FAST SERPL-MCNC: 88 MG/DL (ref 65–94)

## 2022-11-04 ENCOUNTER — TELEPHONE (OUTPATIENT)
Dept: OBSTETRICS AND GYNECOLOGY | Facility: CLINIC | Age: 27
End: 2022-11-04

## 2022-11-04 NOTE — TELEPHONE ENCOUNTER
Returned pts call. She reports that her mother tested positive for COVID this morning. She reports that she was near in mother this morning, but maintained social distancing. She denies any COVID or cold sx at this time. Encouraged pt to maintain social distancing if she had to be around her mother for them to both wear mask as an extra layer of protection. Encouraged good hand hygiene. Discussed 3 hr GTT results. Told pt to call back with any additional concerns. Pt denies additional needs at this time.

## 2022-11-07 ENCOUNTER — PATIENT OUTREACH (OUTPATIENT)
Dept: LABOR AND DELIVERY | Facility: HOSPITAL | Age: 27
End: 2022-11-07

## 2022-11-07 NOTE — OUTREACH NOTE
[facility]  Unable to Reach       Questions/Answers    Flowsheet Row Responses   Pending Outreach Intake Visit   Call Attempt Third   Outcome No answer/busy, Left message, [employer] message sent to patient   Next Call Attempt Date 01/16/23          MyChart message sent with basic resources. Contact information provided.    Joslyn Gallego, RN  Maternity Nurse Navigator    11/7/2022, 15:54 EST

## 2022-11-11 ENCOUNTER — TELEPHONE (OUTPATIENT)
Dept: OBSTETRICS AND GYNECOLOGY | Facility: CLINIC | Age: 27
End: 2022-11-11

## 2022-11-11 NOTE — TELEPHONE ENCOUNTER
Pt calling back about lab results. Pt requesting a [employer] message if possible so that she doesn't miss a call.

## 2022-11-21 ENCOUNTER — TELEPHONE (OUTPATIENT)
Dept: OBSTETRICS AND GYNECOLOGY | Facility: CLINIC | Age: 27
End: 2022-11-21

## 2022-11-21 NOTE — TELEPHONE ENCOUNTER
SENT MESSAGE TO [facility] TO LET THEM KNOW TO LOOK OUT FOR THIS, THEY STILL HAD NOT RECEIVED IT BUT WAS CALLING PT TO REFAX

## 2022-11-21 NOTE — TELEPHONE ENCOUNTER
PT IS REQUESTING TO HAVE DR SHOOK FILL OUT LEAVE OF ABSENCE  PAPERWORK FOR PT'S SHORT TERM DISABILITY. PT CAN BE REACHED ANYTIME [phone] FOR ADDITIONAL INFORMATION.

## 2022-11-21 NOTE — TELEPHONE ENCOUNTER
This paperwork needs to be done by 12/6/2022. She has faxed to both offices. This is for after her delivery.

## 2022-11-22 ENCOUNTER — ROUTINE PRENATAL (OUTPATIENT)
Dept: OBSTETRICS AND GYNECOLOGY | Facility: CLINIC | Age: 27
End: 2022-11-22

## 2022-11-22 VITALS — SYSTOLIC BLOOD PRESSURE: 122 MMHG | DIASTOLIC BLOOD PRESSURE: 82 MMHG | BODY MASS INDEX: 63.83 KG/M2 | WEIGHT: 293 LBS

## 2022-11-22 DIAGNOSIS — N30.00 ACUTE CYSTITIS WITHOUT HEMATURIA: Primary | ICD-10-CM

## 2022-11-22 DIAGNOSIS — N89.8 VAGINAL DISCHARGE: ICD-10-CM

## 2022-11-22 DIAGNOSIS — B37.9 YEAST INFECTION: ICD-10-CM

## 2022-11-22 DIAGNOSIS — Z34.03 ENCOUNTER FOR SUPERVISION OF NORMAL FIRST PREGNANCY IN THIRD TRIMESTER: ICD-10-CM

## 2022-11-22 LAB
BILIRUB BLD-MCNC: NEGATIVE MG/DL
CLARITY, POC: ABNORMAL
COLOR UR: YELLOW
GLUCOSE UR STRIP-MCNC: NEGATIVE MG/DL
GLUCOSE UR STRIP-MCNC: NEGATIVE MG/DL
KETONES UR QL: NEGATIVE
LEUKOCYTE EST, POC: ABNORMAL
NITRITE UR-MCNC: NEGATIVE MG/ML
PH UR: 7 [PH] (ref 5–8)
PROT UR STRIP-MCNC: ABNORMAL MG/DL
PROT UR STRIP-MCNC: ABNORMAL MG/DL
RBC # UR STRIP: NEGATIVE /UL
SP GR UR: 1.02 (ref 1–1.03)
UROBILINOGEN UR QL: NORMAL

## 2022-11-22 PROCEDURE — 0502F SUBSEQUENT PRENATAL CARE: CPT | Performed by: OBSTETRICS & GYNECOLOGY

## 2022-11-22 NOTE — PROGRESS NOTES
OB FOLLOW UP  CC- Here for care of pregnancy        Layne Aguilar is a 27 y.o.  32w1d patient being seen today for her obstetrical follow up visit. Patient reports itching and slight thick white discharge.     Her prenatal care is complicated by (and status) :  None  Patient Active Problem List   Diagnosis   • Dysmenorrhea   • Umbilical hernia   • Premenopausal menorrhagia   • Chronic rhinitis   • Morbid obesity (HCC)   • Allergic rhinitis   • Abscess of abdominal wall   • Acute vaginitis   • Epidermoid cyst   • Otitis media   • Pain in left knee   • Urinary tract infectious disease   • Viral hepatitis C   • Morbid obesity with BMI of 60.0-69.9, adult (HCC)   • Supervision of other normal pregnancy, antepartum       Flu Status: Already given in current flu season  TDAP status: received at last visit  28 week labs: Reviewed and normal  Ultrasound Today: No  Non Stress Test: No.    ROS -   Patient Reports : No Problems  Patient Denies: Loss of Fluid, Vaginal Spotting, Vision Changes, Headaches, Nausea , Vomiting , Contractions and Epigastric pain  Fetal Movement : normal  All other systems reviewed and are negative.       The additional following portions of the patient's history were reviewed and updated as appropriate: allergies, current medications, past family history, past medical history, past social history, past surgical history and problem list.    I have reviewed and agree with the HPI, ROS, and historical information as entered above. Mukesh Henry MD    /82   Wt (!) 158 kg (349 lb)   LMP 2022   BMI 63.83 kg/m²       EXAM:     Prenatal Vitals  BP: 122/82  Weight: (!) 158 kg (349 lb)   Fetal Heart Rate: 153               Urine Glucose Read-only: Negative  Urine Protein Read-only: (!) Trace       Assessment and Plan    Problem List Items Addressed This Visit        Genitourinary and Reproductive     Urinary tract infectious disease - Primary    Relevant Orders    Urine Culture -  Urine, Urine, Clean Catch   Other Visit Diagnoses     Vaginal discharge        Relevant Orders    NuSwab BV & Candida - Swab, Vagina    Yeast infection        Relevant Medications    terconazole (TERAZOL 7) 0.4 % vaginal cream    Encounter for supervision of normal first pregnancy in third trimester              1. Pregnancy at 32w1d  2. Fetal status reassuring.  3. 28 week labs reviewed.    4. Activity and Exercise discussed.  5. Fetal movement/PTL or Labor precautions  Return in about 2 weeks (around 12/6/2022).    Mukesh Henry MD  11/22/2022

## 2022-11-24 LAB
BACTERIA UR CULT: NORMAL
BACTERIA UR CULT: NORMAL

## 2022-12-06 ENCOUNTER — ROUTINE PRENATAL (OUTPATIENT)
Dept: OBSTETRICS AND GYNECOLOGY | Facility: CLINIC | Age: 27
End: 2022-12-06

## 2022-12-06 VITALS — BODY MASS INDEX: 64.38 KG/M2 | WEIGHT: 293 LBS | DIASTOLIC BLOOD PRESSURE: 80 MMHG | SYSTOLIC BLOOD PRESSURE: 110 MMHG

## 2022-12-06 DIAGNOSIS — Z3A.34 34 WEEKS GESTATION OF PREGNANCY: ICD-10-CM

## 2022-12-06 DIAGNOSIS — F12.10 MILD TETRAHYDROCANNABINOL (THC) ABUSE: Primary | ICD-10-CM

## 2022-12-06 LAB
GLUCOSE UR STRIP-MCNC: NEGATIVE MG/DL
PROT UR STRIP-MCNC: NEGATIVE MG/DL

## 2022-12-06 PROCEDURE — 0502F SUBSEQUENT PRENATAL CARE: CPT | Performed by: OBSTETRICS & GYNECOLOGY

## 2022-12-06 NOTE — PROGRESS NOTES
OB FOLLOW UP  CC- Here for care of pregnancy        Layne Aguilar is a 27 y.o.  34w1d patient being seen today for her obstetrical follow up visit. Patient reports no complaints..     Her prenatal care is complicated by (and status) : None  Patient Active Problem List   Diagnosis   • Dysmenorrhea   • Umbilical hernia   • Premenopausal menorrhagia   • Chronic rhinitis   • Morbid obesity (HCC)   • Allergic rhinitis   • Abscess of abdominal wall   • Acute vaginitis   • Epidermoid cyst   • Otitis media   • Pain in left knee   • Urinary tract infectious disease   • Viral hepatitis C   • Morbid obesity with BMI of 60.0-69.9, adult (HCC)   • Supervision of other normal pregnancy, antepartum       Flu Status: Already given in current flu season  Ultrasound Today: No  Non Stress Test: No.      ROS -   Patient Reports : No Problems  Patient Denies: Loss of Fluid, Vaginal Spotting, Vision Changes, Headaches, Nausea , Vomiting  and Contractions  Fetal Movement : normal  All other systems reviewed and are negative.       The additional following portions of the patient's history were reviewed and updated as appropriate: allergies and current medications.    I have reviewed and agree with the HPI, ROS, and historical information as entered above. Mukesh Henry MD    /80   Wt (!) 160 kg (352 lb)   LMP 2022   BMI 64.38 kg/m²       EXAM:     Prenatal Vitals  BP: 110/80  Weight: (!) 160 kg (352 lb)   Fetal Heart Rate: 128               Urine Glucose Read-only: Negative  Urine Protein Read-only: Negative       Assessment and Plan    Problem List Items Addressed This Visit    None  Visit Diagnoses     Mild tetrahydrocannabinol (THC) abuse    -  Primary    Relevant Orders    Urine Drug Screen - Urine, Clean Catch    34 weeks gestation of pregnancy              1. Pregnancy at 34w1d  2. Fetal status reassuring.   3. Activity and Exercise discussed.  4. Fetal movement/PTL or Labor precautions  5. GBS next  visit  Return in about 2 weeks (around 12/20/2022).    Mukesh Henry MD  12/06/2022

## 2022-12-08 LAB
AMPHETAMINES UR QL SCN: NEGATIVE NG/ML
BARBITURATES UR QL SCN: NEGATIVE NG/ML
BENZODIAZ UR QL SCN: NEGATIVE NG/ML
BZE UR QL SCN: NEGATIVE NG/ML
CANNABINOIDS UR QL SCN: POSITIVE NG/ML
CREAT UR-MCNC: 115.7 MG/DL (ref 20–300)
LABORATORY COMMENT REPORT: ABNORMAL
METHADONE UR QL SCN: NEGATIVE NG/ML
OPIATES UR QL SCN: NEGATIVE NG/ML
OXYCODONE+OXYMORPHONE UR QL SCN: NEGATIVE NG/ML
PCP UR QL: NEGATIVE NG/ML
PH UR: 6.8 [PH] (ref 4.5–8.9)
PROPOXYPH UR QL SCN: NEGATIVE NG/ML

## 2022-12-20 ENCOUNTER — ROUTINE PRENATAL (OUTPATIENT)
Dept: OBSTETRICS AND GYNECOLOGY | Facility: CLINIC | Age: 27
End: 2022-12-20

## 2022-12-20 VITALS — BODY MASS INDEX: 64.75 KG/M2 | DIASTOLIC BLOOD PRESSURE: 84 MMHG | SYSTOLIC BLOOD PRESSURE: 112 MMHG | WEIGHT: 293 LBS

## 2022-12-20 DIAGNOSIS — Z3A.36 36 WEEKS GESTATION OF PREGNANCY: Primary | ICD-10-CM

## 2022-12-20 LAB
GLUCOSE UR STRIP-MCNC: NEGATIVE MG/DL
PROT UR STRIP-MCNC: NEGATIVE MG/DL

## 2022-12-20 PROCEDURE — 0502F SUBSEQUENT PRENATAL CARE: CPT | Performed by: OBSTETRICS & GYNECOLOGY

## 2022-12-20 NOTE — PROGRESS NOTES
OB FOLLOW UP  CC- Here for care of pregnancy        Layne Aguilar is a 27 y.o.  36w1d patient being seen today for her obstetrical follow up visit. Patient reports no complaints..     Her prenatal care is complicated by (and status) : None  Patient Active Problem List   Diagnosis   • Dysmenorrhea   • Umbilical hernia   • Premenopausal menorrhagia   • Chronic rhinitis   • Morbid obesity (HCC)   • Allergic rhinitis   • Abscess of abdominal wall   • Acute vaginitis   • Epidermoid cyst   • Otitis media   • Pain in left knee   • Urinary tract infectious disease   • Viral hepatitis C   • Morbid obesity with BMI of 60.0-69.9, adult (HCC)   • Supervision of other normal pregnancy, antepartum       GBS Status: Done Today. She is not allergic to PCN.    No Known Allergies       Flu Status: Already given in current flu season  Her Delivery Plan is: [facility] IOL at 39wks. Scheduled    US today: no  Non Stress Test: No.      ROS -   Patient Reports : No Problems  Patient Denies: Loss of Fluid, Vaginal Spotting, Vision Changes, Headaches, Nausea , Vomiting , Contractions and Epigastric pain  Fetal Movement : normal  All other systems reviewed and are negative.       The additional following portions of the patient's history were reviewed and updated as appropriate: allergies, current medications, past family history, past medical history, past social history, past surgical history and problem list.    I have reviewed and agree with the HPI, ROS, and historical information as entered above. Mukesh Henry MD      EXAM:     Prenatal Vitals  BP: 112/84  Weight: (!) 161 kg (354 lb)   Fetal Heart Rate: 138              Urine Glucose Read-only: Negative  Urine Protein Read-only: Negative       Assessment and Plan    Problem List Items Addressed This Visit    None  Visit Diagnoses     36 weeks gestation of pregnancy    -  Primary    Relevant Orders    Group B Streptococcus Culture - Swab, Vaginal/Rectum          1. Pregnancy at  36w1d  2. Fetal status reassuring.   3. Reviewed Pre-eclampsia signs/symptoms  4. Reviewed upcoming IOL with patient. Instructions given.  5. Delivery options reviewed with patient  6. Signs of labor reviewed  7. Kick counts reviewed  8. Activity and Exercise discussed.  No follow-ups on file.    Mukesh Henry MD  12/20/2022

## 2022-12-24 LAB — B-HEM STREP SPEC QL CULT: NEGATIVE

## 2022-12-27 ENCOUNTER — ROUTINE PRENATAL (OUTPATIENT)
Dept: OBSTETRICS AND GYNECOLOGY | Facility: CLINIC | Age: 27
End: 2022-12-27

## 2022-12-27 VITALS — DIASTOLIC BLOOD PRESSURE: 84 MMHG | SYSTOLIC BLOOD PRESSURE: 118 MMHG | BODY MASS INDEX: 65.3 KG/M2 | WEIGHT: 293 LBS

## 2022-12-27 DIAGNOSIS — Z34.03 ENCOUNTER FOR SUPERVISION OF NORMAL FIRST PREGNANCY IN THIRD TRIMESTER: Primary | ICD-10-CM

## 2022-12-27 PROCEDURE — 0502F SUBSEQUENT PRENATAL CARE: CPT | Performed by: OBSTETRICS & GYNECOLOGY

## 2022-12-27 NOTE — PROGRESS NOTES
OB FOLLOW UP  CC- Here for care of pregnancy        Layne Aguilar is a 27 y.o.  37w1d patient being seen today for her obstetrical follow up visit. Patient reports no complaints..     Her prenatal care is complicated by (and status) : None  Patient Active Problem List   Diagnosis   • Dysmenorrhea   • Umbilical hernia   • Premenopausal menorrhagia   • Chronic rhinitis   • Morbid obesity (HCC)   • Allergic rhinitis   • Abscess of abdominal wall   • Acute vaginitis   • Epidermoid cyst   • Otitis media   • Pain in left knee   • Urinary tract infectious disease   • Viral hepatitis C   • Morbid obesity with BMI of 60.0-69.9, adult (HCC)   • Supervision of other normal pregnancy, antepartum       GBS Status:   Strep Gp B Culture   Date Value Ref Range Status   2022 Negative Negative Final     Comment:     Centers for Disease Control and Prevention (CDC) and American Congress  of Obstetricians and Gynecologists (ACOG) guidelines for prevention of   group B streptococcal (GBS) disease specify co-collection of  a vaginal and rectal swab specimen to maximize sensitivity of GBS  detection. Per the CDC and ACOG, swabbing both the lower vagina and  rectum substantially increases the yield of detection compared with  sampling the vagina alone.  Penicillin G, ampicillin, or cefazolin are indicated for intrapartum  prophylaxis of  GBS colonization. Reflex susceptibility  testing should be performed prior to use of clindamycin only on GBS  isolates from penicillin-allergic women who are considered a high risk  for anaphylaxis. Treatment with vancomycin without additional testing  is warranted if resistance to clindamycin is noted.           No Known Allergies       Flu Status: Already given in current flu season  Her Delivery Plan is: [facility] IOL at 39wks. Scheduled    US today: no  Non Stress Test: No.       ROS -   Patient Reports : No Problems  Patient Denies: Loss of Fluid, Vaginal Spotting,  Vision Changes, Headaches and Nausea   Fetal Movement : normal  All other systems reviewed and are negative.       The additional following portions of the patient's history were reviewed and updated as appropriate: allergies, current medications, past family history, past medical history, past social history, past surgical history and problem list.    I have reviewed and agree with the HPI, ROS, and historical information as entered above. Mueksh Henry MD      EXAM:     Prenatal Vitals  BP: 118/84  Weight: (!) 162 kg (357 lb)   Fetal Heart Rate: 132                      Assessment and Plan    Problem List Items Addressed This Visit    None      1. Pregnancy at 37w1d  2. Fetal status reassuring.   3. Reviewed Pre-eclampsia signs/symptoms  4. Delivery options reviewed with patient  5. Signs of labor reviewed  6. Kick counts reviewed  7. Activity and Exercise discussed.  Return in about 1 week (around 1/3/2023).    Mukesh Henry MD  12/27/2022

## 2023-01-03 ENCOUNTER — ROUTINE PRENATAL (OUTPATIENT)
Dept: OBSTETRICS AND GYNECOLOGY | Facility: CLINIC | Age: 28
End: 2023-01-03
Payer: COMMERCIAL

## 2023-01-03 VITALS — BODY MASS INDEX: 65.66 KG/M2 | DIASTOLIC BLOOD PRESSURE: 80 MMHG | SYSTOLIC BLOOD PRESSURE: 98 MMHG | WEIGHT: 293 LBS

## 2023-01-03 DIAGNOSIS — Z3A.38 38 WEEKS GESTATION OF PREGNANCY: Primary | ICD-10-CM

## 2023-01-03 LAB
GLUCOSE UR STRIP-MCNC: NEGATIVE MG/DL
PROT UR STRIP-MCNC: NEGATIVE MG/DL

## 2023-01-03 PROCEDURE — 0502F SUBSEQUENT PRENATAL CARE: CPT | Performed by: OBSTETRICS & GYNECOLOGY

## 2023-01-03 NOTE — PROGRESS NOTES
OB FOLLOW UP  CC- Here for care of pregnancy        Layne Aguilar is a 27 y.o.  38w1d patient being seen today for her obstetrical follow up visit. Patient reports swelling last couple of days.     Her prenatal care is complicated by (and status) : None  Patient Active Problem List   Diagnosis   • Dysmenorrhea   • Umbilical hernia   • Premenopausal menorrhagia   • Chronic rhinitis   • Morbid obesity (HCC)   • Allergic rhinitis   • Abscess of abdominal wall   • Acute vaginitis   • Epidermoid cyst   • Otitis media   • Pain in left knee   • Urinary tract infectious disease   • Viral hepatitis C   • Morbid obesity with BMI of 60.0-69.9, adult (HCC)   • Supervision of other normal pregnancy, antepartum       GBS Status: Negative  Strep Gp B Culture   Date Value Ref Range Status   2022 Negative Negative Final     Comment:     Centers for Disease Control and Prevention (CDC) and American Congress  of Obstetricians and Gynecologists (ACOG) guidelines for prevention of   group B streptococcal (GBS) disease specify co-collection of  a vaginal and rectal swab specimen to maximize sensitivity of GBS  detection. Per the CDC and ACOG, swabbing both the lower vagina and  rectum substantially increases the yield of detection compared with  sampling the vagina alone.  Penicillin G, ampicillin, or cefazolin are indicated for intrapartum  prophylaxis of  GBS colonization. Reflex susceptibility  testing should be performed prior to use of clindamycin only on GBS  isolates from penicillin-allergic women who are considered a high risk  for anaphylaxis. Treatment with vancomycin without additional testing  is warranted if resistance to clindamycin is noted.           No Known Allergies       Flu Status: Already given in current flu season  Her Delivery Plan is: [facility] IOL at 39wks. Scheduled    US today: no  Non Stress Test: No.       ROS -   Patient Reports : swelling   Patient Denies: Loss of Fluid,  Vaginal Spotting, Vision Changes, Headaches, Nausea  and Vomiting   Fetal Movement : normal  All other systems reviewed and are negative.       The additional following portions of the patient's history were reviewed and updated as appropriate: allergies and current medications.    I have reviewed and agree with the HPI, ROS, and historical information as entered above. Mukesh Henry MD      EXAM:     Prenatal Vitals  BP: 98/80  Weight: (!) 163 kg (359 lb)   Fetal Heart Rate: 130              Urine Glucose Read-only: Negative  Urine Protein Read-only: Negative       Assessment and Plan    Problem List Items Addressed This Visit    None  Visit Diagnoses     38 weeks gestation of pregnancy    -  Primary    Relevant Orders    POC Urinalysis Dipstick (Completed)          1. Pregnancy at 38w1d  2. Fetal status reassuring.   3. Reviewed Pre-eclampsia signs/symptoms  4. need cx checked next week   5. Delivery options reviewed with patient  6. Signs of labor reviewed  7. Kick counts reviewed  8. Activity and Exercise discussed.  No follow-ups on file.    Mukesh Henry MD  01/03/2023

## 2023-01-10 ENCOUNTER — PREP FOR SURGERY (OUTPATIENT)
Dept: OTHER | Facility: HOSPITAL | Age: 28
End: 2023-01-10
Payer: COMMERCIAL

## 2023-01-10 ENCOUNTER — ROUTINE PRENATAL (OUTPATIENT)
Dept: OBSTETRICS AND GYNECOLOGY | Facility: CLINIC | Age: 28
End: 2023-01-10
Payer: COMMERCIAL

## 2023-01-10 VITALS — BODY MASS INDEX: 65.11 KG/M2 | WEIGHT: 293 LBS | SYSTOLIC BLOOD PRESSURE: 110 MMHG | DIASTOLIC BLOOD PRESSURE: 80 MMHG

## 2023-01-10 DIAGNOSIS — Z34.03 ENCOUNTER FOR SUPERVISION OF NORMAL FIRST PREGNANCY IN THIRD TRIMESTER: Primary | ICD-10-CM

## 2023-01-10 DIAGNOSIS — Z3A.39 39 WEEKS GESTATION OF PREGNANCY: Primary | ICD-10-CM

## 2023-01-10 PROCEDURE — 0502F SUBSEQUENT PRENATAL CARE: CPT | Performed by: OBSTETRICS & GYNECOLOGY

## 2023-01-10 RX ORDER — PROMETHAZINE HYDROCHLORIDE 12.5 MG/1
12.5 TABLET ORAL EVERY 6 HOURS PRN
Status: CANCELLED | OUTPATIENT
Start: 2023-01-10

## 2023-01-10 RX ORDER — SODIUM CHLORIDE, SODIUM LACTATE, POTASSIUM CHLORIDE, CALCIUM CHLORIDE 600; 310; 30; 20 MG/100ML; MG/100ML; MG/100ML; MG/100ML
125 INJECTION, SOLUTION INTRAVENOUS CONTINUOUS
Status: CANCELLED | OUTPATIENT
Start: 2023-01-10

## 2023-01-10 RX ORDER — METHYLERGONOVINE MALEATE 0.2 MG/ML
200 INJECTION INTRAVENOUS ONCE AS NEEDED
Status: CANCELLED | OUTPATIENT
Start: 2023-01-10

## 2023-01-10 RX ORDER — OXYTOCIN/0.9 % SODIUM CHLORIDE 30/500 ML
250 PLASTIC BAG, INJECTION (ML) INTRAVENOUS CONTINUOUS
Status: CANCELLED | OUTPATIENT
Start: 2023-01-10 | End: 2023-01-10

## 2023-01-10 RX ORDER — MISOPROSTOL 200 UG/1
800 TABLET ORAL AS NEEDED
Status: CANCELLED | OUTPATIENT
Start: 2023-01-10

## 2023-01-10 RX ORDER — LIDOCAINE HYDROCHLORIDE 10 MG/ML
5 INJECTION, SOLUTION EPIDURAL; INFILTRATION; INTRACAUDAL; PERINEURAL AS NEEDED
Status: CANCELLED | OUTPATIENT
Start: 2023-01-10

## 2023-01-10 RX ORDER — MAGNESIUM CARB/ALUMINUM HYDROX 105-160MG
30 TABLET,CHEWABLE ORAL ONCE
Status: CANCELLED | OUTPATIENT
Start: 2023-01-10 | End: 2023-01-10

## 2023-01-10 RX ORDER — PROMETHAZINE HYDROCHLORIDE 12.5 MG/1
12.5 SUPPOSITORY RECTAL EVERY 6 HOURS PRN
Status: CANCELLED | OUTPATIENT
Start: 2023-01-10

## 2023-01-10 RX ORDER — OXYTOCIN/0.9 % SODIUM CHLORIDE 30/500 ML
2-30 PLASTIC BAG, INJECTION (ML) INTRAVENOUS
Status: CANCELLED | OUTPATIENT
Start: 2023-01-19

## 2023-01-10 RX ORDER — SODIUM CHLORIDE 0.9 % (FLUSH) 0.9 %
10 SYRINGE (ML) INJECTION AS NEEDED
Status: CANCELLED | OUTPATIENT
Start: 2023-01-10

## 2023-01-10 RX ORDER — SODIUM CHLORIDE 0.9 % (FLUSH) 0.9 %
3 SYRINGE (ML) INJECTION EVERY 12 HOURS SCHEDULED
Status: CANCELLED | OUTPATIENT
Start: 2023-01-10

## 2023-01-10 RX ORDER — MORPHINE SULFATE 2 MG/ML
2 INJECTION, SOLUTION INTRAMUSCULAR; INTRAVENOUS
Status: CANCELLED | OUTPATIENT
Start: 2023-01-10 | End: 2023-01-20

## 2023-01-10 RX ORDER — CARBOPROST TROMETHAMINE 250 UG/ML
250 INJECTION, SOLUTION INTRAMUSCULAR AS NEEDED
Status: CANCELLED | OUTPATIENT
Start: 2023-01-10

## 2023-01-10 RX ORDER — OXYCODONE AND ACETAMINOPHEN 7.5; 325 MG/1; MG/1
2 TABLET ORAL EVERY 4 HOURS PRN
Status: CANCELLED | OUTPATIENT
Start: 2023-01-10 | End: 2023-01-20

## 2023-01-10 RX ORDER — ACETAMINOPHEN 325 MG/1
650 TABLET ORAL EVERY 4 HOURS PRN
Status: CANCELLED | OUTPATIENT
Start: 2023-01-10

## 2023-01-10 RX ORDER — OXYTOCIN/0.9 % SODIUM CHLORIDE 30/500 ML
999 PLASTIC BAG, INJECTION (ML) INTRAVENOUS ONCE
Status: CANCELLED | OUTPATIENT
Start: 2023-01-10 | End: 2023-01-10

## 2023-01-10 NOTE — PROGRESS NOTES
OB FOLLOW UP  CC- Here for care of pregnancy        Layne Aguilar is a 27 y.o.  39w1d patient being seen today for her obstetrical follow up visit. Patient reports no complaints..     Her prenatal care is complicated by (and status) : None  Patient Active Problem List   Diagnosis   • Dysmenorrhea   • Umbilical hernia   • Premenopausal menorrhagia   • Chronic rhinitis   • Morbid obesity (HCC)   • Allergic rhinitis   • Abscess of abdominal wall   • Acute vaginitis   • Epidermoid cyst   • Otitis media   • Pain in left knee   • Urinary tract infectious disease   • Viral hepatitis C   • Morbid obesity with BMI of 60.0-69.9, adult (HCC)   • Supervision of other normal pregnancy, antepartum       GBS Status: negative  Strep Gp B Culture   Date Value Ref Range Status   2022 Negative Negative Final     Comment:     Centers for Disease Control and Prevention (CDC) and American Congress  of Obstetricians and Gynecologists (ACOG) guidelines for prevention of   group B streptococcal (GBS) disease specify co-collection of  a vaginal and rectal swab specimen to maximize sensitivity of GBS  detection. Per the CDC and ACOG, swabbing both the lower vagina and  rectum substantially increases the yield of detection compared with  sampling the vagina alone.  Penicillin G, ampicillin, or cefazolin are indicated for intrapartum  prophylaxis of  GBS colonization. Reflex susceptibility  testing should be performed prior to use of clindamycin only on GBS  isolates from penicillin-allergic women who are considered a high risk  for anaphylaxis. Treatment with vancomycin without additional testing  is warranted if resistance to clindamycin is noted.           No Known Allergies       Flu Status: Already given in current flu season  Her Delivery Plan is: [facility] IOL at 39wks. Scheduled    US today: no  Non Stress Test: No.       ROS -   Patient Reports : No Problems  Patient Denies: Loss of Fluid, Vaginal  Spotting, Vision Changes, Headaches, Nausea  and Vomiting   Fetal Movement : normal  All other systems reviewed and are negative.       The additional following portions of the patient's history were reviewed and updated as appropriate: allergies and current medications.    I have reviewed and agree with the HPI, ROS, and historical information as entered above. Mukesh Henry MD      EXAM: not done    Prenatal Vitals  BP: 110/80  Weight: (!) 161 kg (356 lb)   Fetal Heart Rate: 137                      Assessment and Plan    Problem List Items Addressed This Visit    None  Visit Diagnoses     39 weeks gestation of pregnancy    -  Primary    Relevant Orders    POC Urinalysis Dipstick          1. Pregnancy at 39w1d  2. Fetal status reassuring.   3. Reviewed Pre-eclampsia signs/symptoms  4. Reviewed upcoming IOL with patient. Instructions given.  5. Delivery options reviewed with patient  6. Signs of labor reviewed  7. Kick counts reviewed  8. Activity and Exercise discussed.  No follow-ups on file.   No exam and will use FB when admitted    Mukesh Henry MD  01/10/2023

## 2023-01-17 ENCOUNTER — ROUTINE PRENATAL (OUTPATIENT)
Dept: OBSTETRICS AND GYNECOLOGY | Facility: CLINIC | Age: 28
End: 2023-01-17
Payer: COMMERCIAL

## 2023-01-17 ENCOUNTER — PREP FOR SURGERY (OUTPATIENT)
Dept: OTHER | Facility: HOSPITAL | Age: 28
End: 2023-01-17
Payer: COMMERCIAL

## 2023-01-17 VITALS — DIASTOLIC BLOOD PRESSURE: 86 MMHG | WEIGHT: 293 LBS | BODY MASS INDEX: 65.84 KG/M2 | SYSTOLIC BLOOD PRESSURE: 124 MMHG

## 2023-01-17 DIAGNOSIS — Z34.03 ENCOUNTER FOR SUPERVISION OF NORMAL FIRST PREGNANCY IN THIRD TRIMESTER: Primary | ICD-10-CM

## 2023-01-17 LAB
GLUCOSE UR STRIP-MCNC: NEGATIVE MG/DL
PROT UR STRIP-MCNC: NEGATIVE MG/DL

## 2023-01-17 PROCEDURE — 0502F SUBSEQUENT PRENATAL CARE: CPT | Performed by: OBSTETRICS & GYNECOLOGY

## 2023-01-17 NOTE — PROGRESS NOTES
OB FOLLOW UP  CC- Here for care of pregnancy        Layne Aguilar is a 27 y.o.  40w1d patient being seen today for her obstetrical follow up visit. Patient reports no complaints..     Her prenatal care is complicated by (and status) :   Patient Active Problem List   Diagnosis   • Dysmenorrhea   • Umbilical hernia   • Premenopausal menorrhagia   • Chronic rhinitis   • Morbid obesity (HCC)   • Allergic rhinitis   • Abscess of abdominal wall   • Acute vaginitis   • Epidermoid cyst   • Otitis media   • Pain in left knee   • Urinary tract infectious disease   • Viral hepatitis C   • Morbid obesity with BMI of 60.0-69.9, adult (HCC)   • Supervision of other normal pregnancy, antepartum       GBS Status:   Strep Gp B Culture   Date Value Ref Range Status   2022 Negative Negative Final     Comment:     Centers for Disease Control and Prevention (CDC) and American Congress  of Obstetricians and Gynecologists (ACOG) guidelines for prevention of   group B streptococcal (GBS) disease specify co-collection of  a vaginal and rectal swab specimen to maximize sensitivity of GBS  detection. Per the CDC and ACOG, swabbing both the lower vagina and  rectum substantially increases the yield of detection compared with  sampling the vagina alone.  Penicillin G, ampicillin, or cefazolin are indicated for intrapartum  prophylaxis of  GBS colonization. Reflex susceptibility  testing should be performed prior to use of clindamycin only on GBS  isolates from penicillin-allergic women who are considered a high risk  for anaphylaxis. Treatment with vancomycin without additional testing  is warranted if resistance to clindamycin is noted.           No Known Allergies       Flu Status: Already given in current flu season  Her Delivery Plan is: [facility] IOL at 39wks. Scheduled    US today:yes  Non Stress Test: Yes  not done     ROS -   Patient Reports : No Problems  Patient Denies: Loss of Fluid, Vaginal Spotting,  Vision Changes, Headaches, Nausea , Vomiting , Contractions and Epigastric pain  Fetal Movement : normal  All other systems reviewed and are negative.       The additional following portions of the patient's history were reviewed and updated as appropriate: allergies, current medications, past family history, past medical history, past social history, past surgical history and problem list.    I have reviewed and agree with the HPI, ROS, and historical information as entered above. Mukesh Henry MD      EXAM:     Prenatal Vitals  BP: 124/86  Weight: (!) 163 kg (360 lb)   Fetal Heart Rate: 139              Urine Glucose Read-only: Negative  Urine Protein Read-only: Negative       Assessment and Plan    Problem List Items Addressed This Visit    None  Visit Diagnoses     Encounter for supervision of normal first pregnancy in third trimester    -  Primary    Relevant Orders    US Fetal Biophysical Profile;Without Non-Stress Testing        1. BPP was nl Pregnancy at 40w1d  2. Fetal status reassuring.   3. Reviewed Pre-eclampsia signs/symptoms  4. Reviewed upcoming IOL with patient. Instructions given.  5. pt is 1 day overdue and we did BPP which was OK   6. Delivery options reviewed with patient  7. Signs of labor reviewed  8. Kick counts reviewed  9. Activity and Exercise discussed.  Return for iol tomorrow.    Mukesh Henry MD  01/17/2023

## 2023-01-18 ENCOUNTER — PREP FOR SURGERY (OUTPATIENT)
Dept: OTHER | Facility: HOSPITAL | Age: 28
End: 2023-01-18
Payer: COMMERCIAL

## 2023-01-18 ENCOUNTER — HOSPITAL ENCOUNTER (INPATIENT)
Facility: HOSPITAL | Age: 28
LOS: 3 days | Discharge: HOME | End: 2023-01-21
Attending: OBSTETRICS & GYNECOLOGY | Admitting: OBSTETRICS & GYNECOLOGY
Payer: COMMERCIAL

## 2023-01-18 ENCOUNTER — HOSPITAL ENCOUNTER (OUTPATIENT)
Dept: LABOR AND DELIVERY | Facility: HOSPITAL | Age: 28
Discharge: HOME | End: 2023-01-18
Payer: COMMERCIAL

## 2023-01-18 DIAGNOSIS — Z34.03 ENCOUNTER FOR SUPERVISION OF NORMAL FIRST PREGNANCY IN THIRD TRIMESTER: ICD-10-CM

## 2023-01-18 LAB
DEPRECATED RDW RBC AUTO: 43 FL (ref 37–54)
ERYTHROCYTE [DISTWIDTH] IN BLOOD BY AUTOMATED COUNT: 13.8 % (ref 12.3–15.4)
HCT VFR BLD AUTO: 37.2 % (ref 34–46.6)
HGB BLD-MCNC: 12.3 G/DL (ref 12–15.9)
MCH RBC QN AUTO: 28.7 PG (ref 26.6–33)
MCHC RBC AUTO-ENTMCNC: 33.1 G/DL (ref 31.5–35.7)
MCV RBC AUTO: 86.9 FL (ref 79–97)
PLATELET # BLD AUTO: 224 10*3/MM3 (ref 140–450)
PMV BLD AUTO: 11 FL (ref 6–12)
RBC # BLD AUTO: 4.28 10*6/MM3 (ref 3.77–5.28)
WBC NRBC COR # BLD: 11.53 10*3/MM3 (ref 3.4–10.8)

## 2023-01-18 PROCEDURE — 86900 BLOOD TYPING SEROLOGIC ABO: CPT | Performed by: OBSTETRICS & GYNECOLOGY

## 2023-01-18 PROCEDURE — 86850 RBC ANTIBODY SCREEN: CPT | Performed by: OBSTETRICS & GYNECOLOGY

## 2023-01-18 PROCEDURE — 86901 BLOOD TYPING SEROLOGIC RH(D): CPT | Performed by: OBSTETRICS & GYNECOLOGY

## 2023-01-18 PROCEDURE — 85027 COMPLETE CBC AUTOMATED: CPT | Performed by: OBSTETRICS & GYNECOLOGY

## 2023-01-18 RX ORDER — OXYTOCIN/0.9 % SODIUM CHLORIDE 30/500 ML
2-30 PLASTIC BAG, INJECTION (ML) INTRAVENOUS
Status: DISCONTINUED | OUTPATIENT
Start: 2023-01-19 | End: 2023-01-19 | Stop reason: HOSPADM

## 2023-01-18 RX ORDER — SODIUM CHLORIDE, SODIUM LACTATE, POTASSIUM CHLORIDE, CALCIUM CHLORIDE 600; 310; 30; 20 MG/100ML; MG/100ML; MG/100ML; MG/100ML
125 INJECTION, SOLUTION INTRAVENOUS CONTINUOUS
Status: DISCONTINUED | OUTPATIENT
Start: 2023-01-19 | End: 2023-01-19 | Stop reason: SDUPTHER

## 2023-01-18 RX ORDER — MAGNESIUM CARB/ALUMINUM HYDROX 105-160MG
30 TABLET,CHEWABLE ORAL ONCE
Status: DISCONTINUED | OUTPATIENT
Start: 2023-01-19 | End: 2023-01-19 | Stop reason: HOSPADM

## 2023-01-18 RX ORDER — SODIUM CHLORIDE 0.9 % (FLUSH) 0.9 %
3 SYRINGE (ML) INJECTION EVERY 12 HOURS SCHEDULED
Status: DISCONTINUED | OUTPATIENT
Start: 2023-01-19 | End: 2023-01-19 | Stop reason: HOSPADM

## 2023-01-18 RX ORDER — LIDOCAINE HYDROCHLORIDE 10 MG/ML
5 INJECTION, SOLUTION EPIDURAL; INFILTRATION; INTRACAUDAL; PERINEURAL AS NEEDED
Status: DISCONTINUED | OUTPATIENT
Start: 2023-01-18 | End: 2023-01-19

## 2023-01-18 RX ORDER — SODIUM CHLORIDE 0.9 % (FLUSH) 0.9 %
10 SYRINGE (ML) INJECTION AS NEEDED
Status: DISCONTINUED | OUTPATIENT
Start: 2023-01-18 | End: 2023-01-19 | Stop reason: HOSPADM

## 2023-01-18 RX ADMIN — SODIUM CHLORIDE, POTASSIUM CHLORIDE, SODIUM LACTATE AND CALCIUM CHLORIDE 125 ML/HR: 600; 310; 30; 20 INJECTION, SOLUTION INTRAVENOUS at 23:51

## 2023-01-19 ENCOUNTER — ANESTHESIA EVENT (OUTPATIENT)
Dept: LABOR AND DELIVERY | Facility: HOSPITAL | Age: 28
End: 2023-01-19
Payer: COMMERCIAL

## 2023-01-19 ENCOUNTER — ANESTHESIA (OUTPATIENT)
Dept: LABOR AND DELIVERY | Facility: HOSPITAL | Age: 28
End: 2023-01-19
Payer: COMMERCIAL

## 2023-01-19 PROBLEM — O09.93 SUPERVISION OF HIGH-RISK PREGNANCY, THIRD TRIMESTER: Status: ACTIVE | Noted: 2023-01-19

## 2023-01-19 LAB
ABO GROUP BLD: NORMAL
ABO GROUP BLD: NORMAL
AMPHET+METHAMPHET UR QL: NEGATIVE
AMPHETAMINES UR QL: NEGATIVE
BARBITURATES UR QL SCN: NEGATIVE
BENZODIAZ UR QL SCN: NEGATIVE
BLD GP AB SCN SERPL QL: NEGATIVE
BUPRENORPHINE SERPL-MCNC: NEGATIVE NG/ML
CANNABINOIDS SERPL QL: NEGATIVE
COCAINE UR QL: NEGATIVE
METHADONE UR QL SCN: NEGATIVE
OPIATES UR QL: NEGATIVE
OXYCODONE UR QL SCN: NEGATIVE
PCP UR QL SCN: NEGATIVE
PROPOXYPH UR QL: NEGATIVE
RH BLD: NEGATIVE
RH BLD: NEGATIVE
T&S EXPIRATION DATE: NORMAL
TRICYCLICS UR QL SCN: NEGATIVE

## 2023-01-19 PROCEDURE — 59200 INSERT CERVICAL DILATOR: CPT | Performed by: OBSTETRICS & GYNECOLOGY

## 2023-01-19 PROCEDURE — 25010000002 MIDAZOLAM PER 1 MG: Performed by: NURSE ANESTHETIST, CERTIFIED REGISTERED

## 2023-01-19 PROCEDURE — 51702 INSERT TEMP BLADDER CATH: CPT

## 2023-01-19 PROCEDURE — 25010000002 ONDANSETRON PER 1 MG: Performed by: NURSE ANESTHETIST, CERTIFIED REGISTERED

## 2023-01-19 PROCEDURE — 25010000002 ROPIVACAINE PER 1 MG: Performed by: NURSE ANESTHETIST, CERTIFIED REGISTERED

## 2023-01-19 PROCEDURE — 59025 FETAL NON-STRESS TEST: CPT

## 2023-01-19 PROCEDURE — 25010000002 BUTORPHANOL PER 1 MG: Performed by: OBSTETRICS & GYNECOLOGY

## 2023-01-19 PROCEDURE — 10907ZC DRAINAGE OF AMNIOTIC FLUID, THERAPEUTIC FROM PRODUCTS OF CONCEPTION, VIA NATURAL OR ARTIFICIAL OPENING: ICD-10-PCS | Performed by: OBSTETRICS & GYNECOLOGY

## 2023-01-19 PROCEDURE — 25010000002 KETOROLAC TROMETHAMINE PER 15 MG: Performed by: OBSTETRICS & GYNECOLOGY

## 2023-01-19 PROCEDURE — 80306 DRUG TEST PRSMV INSTRMNT: CPT | Performed by: OBSTETRICS & GYNECOLOGY

## 2023-01-19 PROCEDURE — 0 MORPHINE PER 10 MG: Performed by: ANESTHESIOLOGY

## 2023-01-19 PROCEDURE — 25010000002 FENTANYL CITRATE (PF) 50 MCG/ML SOLUTION: Performed by: NURSE ANESTHETIST, CERTIFIED REGISTERED

## 2023-01-19 PROCEDURE — C1755 CATHETER, INTRASPINAL: HCPCS | Performed by: ANESTHESIOLOGY

## 2023-01-19 PROCEDURE — 25010000002 OXYTOCIN PER 10 UNITS: Performed by: OBSTETRICS & GYNECOLOGY

## 2023-01-19 PROCEDURE — 86900 BLOOD TYPING SEROLOGIC ABO: CPT

## 2023-01-19 PROCEDURE — 3E033VJ INTRODUCTION OF OTHER HORMONE INTO PERIPHERAL VEIN, PERCUTANEOUS APPROACH: ICD-10-PCS | Performed by: OBSTETRICS & GYNECOLOGY

## 2023-01-19 PROCEDURE — 86901 BLOOD TYPING SEROLOGIC RH(D): CPT

## 2023-01-19 PROCEDURE — 59510 CESAREAN DELIVERY: CPT | Performed by: OBSTETRICS & GYNECOLOGY

## 2023-01-19 PROCEDURE — C1755 CATHETER, INTRASPINAL: HCPCS

## 2023-01-19 RX ORDER — CEFAZOLIN SODIUM IN 0.9 % NACL 3 G/100 ML
3 INTRAVENOUS SOLUTION, PIGGYBACK (ML) INTRAVENOUS ONCE
Status: COMPLETED | OUTPATIENT
Start: 2023-01-19 | End: 2023-01-19

## 2023-01-19 RX ORDER — LIDOCAINE HYDROCHLORIDE AND EPINEPHRINE 15; 5 MG/ML; UG/ML
INJECTION, SOLUTION EPIDURAL AS NEEDED
Status: DISCONTINUED | OUTPATIENT
Start: 2023-01-19 | End: 2023-01-19

## 2023-01-19 RX ORDER — METOCLOPRAMIDE HYDROCHLORIDE 5 MG/ML
10 INJECTION INTRAMUSCULAR; INTRAVENOUS ONCE AS NEEDED
Status: DISCONTINUED | OUTPATIENT
Start: 2023-01-19 | End: 2023-01-19 | Stop reason: HOSPADM

## 2023-01-19 RX ORDER — SODIUM CHLORIDE 0.9 % (FLUSH) 0.9 %
10 SYRINGE (ML) INJECTION EVERY 12 HOURS SCHEDULED
Status: DISCONTINUED | OUTPATIENT
Start: 2023-01-19 | End: 2023-01-19 | Stop reason: HOSPADM

## 2023-01-19 RX ORDER — ACETAMINOPHEN 325 MG/1
650 TABLET ORAL EVERY 6 HOURS
Status: DISCONTINUED | OUTPATIENT
Start: 2023-01-20 | End: 2023-01-21 | Stop reason: HOSPADM

## 2023-01-19 RX ORDER — FAMOTIDINE 10 MG/ML
INJECTION, SOLUTION INTRAVENOUS AS NEEDED
Status: DISCONTINUED | OUTPATIENT
Start: 2023-01-19 | End: 2023-01-19 | Stop reason: SURG

## 2023-01-19 RX ORDER — DIPHENHYDRAMINE HYDROCHLORIDE 50 MG/ML
25 INJECTION INTRAMUSCULAR; INTRAVENOUS EVERY 4 HOURS PRN
Status: DISCONTINUED | OUTPATIENT
Start: 2023-01-19 | End: 2023-01-20

## 2023-01-19 RX ORDER — LIDOCAINE HYDROCHLORIDE AND EPINEPHRINE 20; 5 MG/ML; UG/ML
INJECTION, SOLUTION EPIDURAL; INFILTRATION; INTRACAUDAL; PERINEURAL AS NEEDED
Status: DISCONTINUED | OUTPATIENT
Start: 2023-01-19 | End: 2023-01-19 | Stop reason: SURG

## 2023-01-19 RX ORDER — ONDANSETRON 2 MG/ML
4 INJECTION INTRAMUSCULAR; INTRAVENOUS ONCE AS NEEDED
Status: DISCONTINUED | OUTPATIENT
Start: 2023-01-19 | End: 2023-01-19 | Stop reason: HOSPADM

## 2023-01-19 RX ORDER — MORPHINE SULFATE 0.5 MG/ML
INJECTION, SOLUTION EPIDURAL; INTRATHECAL; INTRAVENOUS AS NEEDED
Status: DISCONTINUED | OUTPATIENT
Start: 2023-01-19 | End: 2023-01-19 | Stop reason: SURG

## 2023-01-19 RX ORDER — PRENATAL VIT/IRON FUM/FOLIC AC 27MG-0.8MG
1 TABLET ORAL DAILY
Status: DISCONTINUED | OUTPATIENT
Start: 2023-01-19 | End: 2023-01-21 | Stop reason: HOSPADM

## 2023-01-19 RX ORDER — FENTANYL CITRATE 50 UG/ML
INJECTION, SOLUTION INTRAMUSCULAR; INTRAVENOUS AS NEEDED
Status: DISCONTINUED | OUTPATIENT
Start: 2023-01-19 | End: 2023-01-19 | Stop reason: SURG

## 2023-01-19 RX ORDER — TERBUTALINE SULFATE 1 MG/ML
INJECTION, SOLUTION SUBCUTANEOUS
Status: DISCONTINUED
Start: 2023-01-19 | End: 2023-01-21 | Stop reason: HOSPADM

## 2023-01-19 RX ORDER — NALOXONE HCL 0.4 MG/ML
0.4 VIAL (ML) INJECTION
Status: ACTIVE | OUTPATIENT
Start: 2023-01-19 | End: 2023-01-20

## 2023-01-19 RX ORDER — SODIUM CHLORIDE 0.9 % (FLUSH) 0.9 %
10 SYRINGE (ML) INJECTION AS NEEDED
Status: DISCONTINUED | OUTPATIENT
Start: 2023-01-19 | End: 2023-01-19 | Stop reason: HOSPADM

## 2023-01-19 RX ORDER — LIDOCAINE HYDROCHLORIDE 10 MG/ML
5 INJECTION, SOLUTION EPIDURAL; INFILTRATION; INTRACAUDAL; PERINEURAL AS NEEDED
Status: DISCONTINUED | OUTPATIENT
Start: 2023-01-19 | End: 2023-01-19 | Stop reason: HOSPADM

## 2023-01-19 RX ORDER — KETOROLAC TROMETHAMINE 30 MG/ML
30 INJECTION, SOLUTION INTRAMUSCULAR; INTRAVENOUS ONCE
Status: CANCELLED | OUTPATIENT
Start: 2023-01-19 | End: 2023-01-19

## 2023-01-19 RX ORDER — NALOXONE HCL 0.4 MG/ML
0.1 VIAL (ML) INJECTION
Status: DISCONTINUED | OUTPATIENT
Start: 2023-01-19 | End: 2023-01-21 | Stop reason: HOSPADM

## 2023-01-19 RX ORDER — METHYLERGONOVINE MALEATE 0.2 MG/ML
200 INJECTION INTRAVENOUS ONCE AS NEEDED
Status: CANCELLED | OUTPATIENT
Start: 2023-01-19

## 2023-01-19 RX ORDER — OXYCODONE HYDROCHLORIDE 5 MG/1
10 TABLET ORAL EVERY 6 HOURS PRN
Status: DISCONTINUED | OUTPATIENT
Start: 2023-01-19 | End: 2023-01-21 | Stop reason: HOSPADM

## 2023-01-19 RX ORDER — MIDAZOLAM HYDROCHLORIDE 1 MG/ML
INJECTION INTRAMUSCULAR; INTRAVENOUS AS NEEDED
Status: DISCONTINUED | OUTPATIENT
Start: 2023-01-19 | End: 2023-01-19 | Stop reason: SURG

## 2023-01-19 RX ORDER — KETOROLAC TROMETHAMINE 30 MG/ML
30 INJECTION, SOLUTION INTRAMUSCULAR; INTRAVENOUS ONCE
Status: COMPLETED | OUTPATIENT
Start: 2023-01-19 | End: 2023-01-19

## 2023-01-19 RX ORDER — OXYTOCIN/0.9 % SODIUM CHLORIDE 30/500 ML
PLASTIC BAG, INJECTION (ML) INTRAVENOUS AS NEEDED
Status: DISCONTINUED | OUTPATIENT
Start: 2023-01-19 | End: 2023-01-19 | Stop reason: SURG

## 2023-01-19 RX ORDER — EPHEDRINE SULFATE 5 MG/ML
10 INJECTION INTRAVENOUS
Status: DISCONTINUED | OUTPATIENT
Start: 2023-01-19 | End: 2023-01-19 | Stop reason: HOSPADM

## 2023-01-19 RX ORDER — KETOROLAC TROMETHAMINE 15 MG/ML
15 INJECTION, SOLUTION INTRAMUSCULAR; INTRAVENOUS EVERY 6 HOURS
Status: COMPLETED | OUTPATIENT
Start: 2023-01-20 | End: 2023-01-20

## 2023-01-19 RX ORDER — DIPHENHYDRAMINE HYDROCHLORIDE 50 MG/ML
12.5 INJECTION INTRAMUSCULAR; INTRAVENOUS EVERY 8 HOURS PRN
Status: DISCONTINUED | OUTPATIENT
Start: 2023-01-19 | End: 2023-01-19 | Stop reason: SDUPTHER

## 2023-01-19 RX ORDER — EPHEDRINE SULFATE 5 MG/ML
INJECTION INTRAVENOUS
Status: COMPLETED
Start: 2023-01-19 | End: 2023-01-19

## 2023-01-19 RX ORDER — SIMETHICONE 80 MG
80 TABLET,CHEWABLE ORAL 4 TIMES DAILY PRN
Status: DISCONTINUED | OUTPATIENT
Start: 2023-01-19 | End: 2023-01-21 | Stop reason: HOSPADM

## 2023-01-19 RX ORDER — ROPIVACAINE HYDROCHLORIDE 2 MG/ML
15 INJECTION, SOLUTION EPIDURAL; INFILTRATION; PERINEURAL CONTINUOUS
Status: DISCONTINUED | OUTPATIENT
Start: 2023-01-19 | End: 2023-01-20

## 2023-01-19 RX ORDER — ACETAMINOPHEN 500 MG
1000 TABLET ORAL EVERY 6 HOURS
Status: COMPLETED | OUTPATIENT
Start: 2023-01-19 | End: 2023-01-20

## 2023-01-19 RX ORDER — HYDROCORTISONE 25 MG/G
1 CREAM TOPICAL AS NEEDED
Status: DISCONTINUED | OUTPATIENT
Start: 2023-01-19 | End: 2023-01-21 | Stop reason: HOSPADM

## 2023-01-19 RX ORDER — OXYCODONE HYDROCHLORIDE 5 MG/1
5 TABLET ORAL EVERY 6 HOURS PRN
Status: DISCONTINUED | OUTPATIENT
Start: 2023-01-19 | End: 2023-01-21 | Stop reason: HOSPADM

## 2023-01-19 RX ORDER — ONDANSETRON 2 MG/ML
4 INJECTION INTRAMUSCULAR; INTRAVENOUS ONCE AS NEEDED
Status: ACTIVE | OUTPATIENT
Start: 2023-01-19 | End: 2023-01-20

## 2023-01-19 RX ORDER — OXYTOCIN/0.9 % SODIUM CHLORIDE 30/500 ML
650 PLASTIC BAG, INJECTION (ML) INTRAVENOUS ONCE
Status: CANCELLED | OUTPATIENT
Start: 2023-01-19 | End: 2023-01-19

## 2023-01-19 RX ORDER — CARBOPROST TROMETHAMINE 250 UG/ML
250 INJECTION, SOLUTION INTRAMUSCULAR AS NEEDED
Status: DISCONTINUED | OUTPATIENT
Start: 2023-01-19 | End: 2023-01-21 | Stop reason: HOSPADM

## 2023-01-19 RX ORDER — DIPHENHYDRAMINE HYDROCHLORIDE 50 MG/ML
25 INJECTION INTRAMUSCULAR; INTRAVENOUS ONCE AS NEEDED
Status: DISCONTINUED | OUTPATIENT
Start: 2023-01-19 | End: 2023-01-20

## 2023-01-19 RX ORDER — CARBOPROST TROMETHAMINE 250 UG/ML
250 INJECTION, SOLUTION INTRAMUSCULAR
Status: CANCELLED | OUTPATIENT
Start: 2023-01-19

## 2023-01-19 RX ORDER — SODIUM CHLORIDE, SODIUM LACTATE, POTASSIUM CHLORIDE, CALCIUM CHLORIDE 600; 310; 30; 20 MG/100ML; MG/100ML; MG/100ML; MG/100ML
125 INJECTION, SOLUTION INTRAVENOUS CONTINUOUS
Status: DISCONTINUED | OUTPATIENT
Start: 2023-01-19 | End: 2023-01-20

## 2023-01-19 RX ORDER — DIPHENHYDRAMINE HCL 25 MG
25 CAPSULE ORAL EVERY 4 HOURS PRN
Status: DISCONTINUED | OUTPATIENT
Start: 2023-01-19 | End: 2023-01-20

## 2023-01-19 RX ORDER — IBUPROFEN 600 MG/1
600 TABLET ORAL EVERY 6 HOURS
Status: DISCONTINUED | OUTPATIENT
Start: 2023-01-21 | End: 2023-01-21 | Stop reason: HOSPADM

## 2023-01-19 RX ORDER — DOCUSATE SODIUM 100 MG/1
100 CAPSULE, LIQUID FILLED ORAL 2 TIMES DAILY PRN
Status: DISCONTINUED | OUTPATIENT
Start: 2023-01-19 | End: 2023-01-21 | Stop reason: HOSPADM

## 2023-01-19 RX ORDER — ALUMINA, MAGNESIA, AND SIMETHICONE 2400; 2400; 240 MG/30ML; MG/30ML; MG/30ML
15 SUSPENSION ORAL EVERY 4 HOURS PRN
Status: DISCONTINUED | OUTPATIENT
Start: 2023-01-19 | End: 2023-01-21 | Stop reason: HOSPADM

## 2023-01-19 RX ORDER — MISOPROSTOL 200 UG/1
600 TABLET ORAL AS NEEDED
Status: DISCONTINUED | OUTPATIENT
Start: 2023-01-19 | End: 2023-01-21 | Stop reason: HOSPADM

## 2023-01-19 RX ORDER — ONDANSETRON 2 MG/ML
INJECTION INTRAMUSCULAR; INTRAVENOUS AS NEEDED
Status: DISCONTINUED | OUTPATIENT
Start: 2023-01-19 | End: 2023-01-19 | Stop reason: SURG

## 2023-01-19 RX ORDER — CALCIUM CARBONATE 200(500)MG
1 TABLET,CHEWABLE ORAL EVERY 4 HOURS PRN
Status: DISCONTINUED | OUTPATIENT
Start: 2023-01-19 | End: 2023-01-21 | Stop reason: HOSPADM

## 2023-01-19 RX ORDER — TRISODIUM CITRATE DIHYDRATE AND CITRIC ACID MONOHYDRATE 500; 334 MG/5ML; MG/5ML
30 SOLUTION ORAL ONCE
Status: COMPLETED | OUTPATIENT
Start: 2023-01-19 | End: 2023-01-19

## 2023-01-19 RX ORDER — ACETAMINOPHEN 500 MG
1000 TABLET ORAL ONCE
Status: DISCONTINUED | OUTPATIENT
Start: 2023-01-19 | End: 2023-01-19 | Stop reason: HOSPADM

## 2023-01-19 RX ORDER — TRISODIUM CITRATE DIHYDRATE AND CITRIC ACID MONOHYDRATE 500; 334 MG/5ML; MG/5ML
30 SOLUTION ORAL ONCE
Status: DISCONTINUED | OUTPATIENT
Start: 2023-01-19 | End: 2023-01-19 | Stop reason: SDUPTHER

## 2023-01-19 RX ORDER — HYDROMORPHONE HYDROCHLORIDE 1 MG/ML
0.5 INJECTION, SOLUTION INTRAMUSCULAR; INTRAVENOUS; SUBCUTANEOUS
Status: DISCONTINUED | OUTPATIENT
Start: 2023-01-19 | End: 2023-01-21 | Stop reason: HOSPADM

## 2023-01-19 RX ORDER — FAMOTIDINE 10 MG/ML
20 INJECTION, SOLUTION INTRAVENOUS ONCE AS NEEDED
Status: DISCONTINUED | OUTPATIENT
Start: 2023-01-19 | End: 2023-01-19 | Stop reason: HOSPADM

## 2023-01-19 RX ORDER — OXYTOCIN/0.9 % SODIUM CHLORIDE 30/500 ML
85 PLASTIC BAG, INJECTION (ML) INTRAVENOUS ONCE
Status: CANCELLED | OUTPATIENT
Start: 2023-01-19 | End: 2023-01-19

## 2023-01-19 RX ADMIN — SODIUM CHLORIDE, POTASSIUM CHLORIDE, SODIUM LACTATE AND CALCIUM CHLORIDE 125 ML/HR: 600; 310; 30; 20 INJECTION, SOLUTION INTRAVENOUS at 14:18

## 2023-01-19 RX ADMIN — LIDOCAINE HYDROCHLORIDE AND EPINEPHRINE 10 ML: 20; 5 INJECTION, SOLUTION EPIDURAL; INFILTRATION; INTRACAUDAL; PERINEURAL at 14:50

## 2023-01-19 RX ADMIN — MORPHINE SULFATE 3 MG: 0.5 INJECTION, SOLUTION EPIDURAL; INTRATHECAL; INTRAVENOUS at 15:35

## 2023-01-19 RX ADMIN — ROPIVACAINE HYDROCHLORIDE 15 ML/HR: 2 INJECTION, SOLUTION EPIDURAL; INFILTRATION at 09:34

## 2023-01-19 RX ADMIN — KETOROLAC TROMETHAMINE 30 MG: 30 INJECTION, SOLUTION INTRAMUSCULAR; INTRAVENOUS at 17:15

## 2023-01-19 RX ADMIN — ACETAMINOPHEN 1000 MG: 500 TABLET, FILM COATED ORAL at 21:09

## 2023-01-19 RX ADMIN — Medication 3 G: at 14:45

## 2023-01-19 RX ADMIN — MIDAZOLAM 1 MG: 1 INJECTION INTRAMUSCULAR; INTRAVENOUS at 15:43

## 2023-01-19 RX ADMIN — OXYTOCIN 2 MILLI-UNITS/MIN: 10 INJECTION, SOLUTION INTRAMUSCULAR; INTRAVENOUS at 05:20

## 2023-01-19 RX ADMIN — FENTANYL CITRATE 100 MCG: 50 INJECTION, SOLUTION INTRAMUSCULAR; INTRAVENOUS at 09:28

## 2023-01-19 RX ADMIN — LIDOCAINE HYDROCHLORIDE AND EPINEPHRINE 3 ML: 15; 5 INJECTION, SOLUTION EPIDURAL at 09:25

## 2023-01-19 RX ADMIN — MIDAZOLAM 1 MG: 1 INJECTION INTRAMUSCULAR; INTRAVENOUS at 15:34

## 2023-01-19 RX ADMIN — MIDAZOLAM 1 MG: 1 INJECTION INTRAMUSCULAR; INTRAVENOUS at 15:10

## 2023-01-19 RX ADMIN — ONDANSETRON 4 MG: 2 INJECTION INTRAMUSCULAR; INTRAVENOUS at 15:15

## 2023-01-19 RX ADMIN — MIDAZOLAM 1 MG: 1 INJECTION INTRAMUSCULAR; INTRAVENOUS at 15:46

## 2023-01-19 RX ADMIN — Medication 500 ML: at 15:33

## 2023-01-19 RX ADMIN — MORPHINE SULFATE 2 MG: 0.5 INJECTION, SOLUTION EPIDURAL; INTRATHECAL; INTRAVENOUS at 15:56

## 2023-01-19 RX ADMIN — SODIUM CHLORIDE, POTASSIUM CHLORIDE, SODIUM LACTATE AND CALCIUM CHLORIDE 1000 ML: 600; 310; 30; 20 INJECTION, SOLUTION INTRAVENOUS at 09:40

## 2023-01-19 RX ADMIN — SODIUM CHLORIDE, POTASSIUM CHLORIDE, SODIUM LACTATE AND CALCIUM CHLORIDE 1000 ML: 600; 310; 30; 20 INJECTION, SOLUTION INTRAVENOUS at 09:15

## 2023-01-19 RX ADMIN — Medication 500 ML: at 15:56

## 2023-01-19 RX ADMIN — MIDAZOLAM 1 MG: 1 INJECTION INTRAMUSCULAR; INTRAVENOUS at 16:03

## 2023-01-19 RX ADMIN — LIDOCAINE HYDROCHLORIDE AND EPINEPHRINE 5 ML: 20; 5 INJECTION, SOLUTION EPIDURAL; INFILTRATION; INTRACAUDAL; PERINEURAL at 15:47

## 2023-01-19 RX ADMIN — MIDAZOLAM 1 MG: 1 INJECTION INTRAMUSCULAR; INTRAVENOUS at 15:49

## 2023-01-19 RX ADMIN — EPHEDRINE SULFATE 10 MG: 5 INJECTION INTRAVENOUS at 13:56

## 2023-01-19 RX ADMIN — EPHEDRINE SULFATE 10 MG: 5 INJECTION INTRAVENOUS at 09:32

## 2023-01-19 RX ADMIN — ROPIVACAINE HYDROCHLORIDE 10 ML: 5 INJECTION, SOLUTION EPIDURAL; INFILTRATION; PERINEURAL at 09:32

## 2023-01-19 RX ADMIN — MIDAZOLAM 1 MG: 1 INJECTION INTRAMUSCULAR; INTRAVENOUS at 16:06

## 2023-01-19 RX ADMIN — BUTORPHANOL TARTRATE 2 MG: 2 INJECTION, SOLUTION INTRAMUSCULAR; INTRAVENOUS at 03:25

## 2023-01-19 RX ADMIN — SODIUM CHLORIDE, POTASSIUM CHLORIDE, SODIUM LACTATE AND CALCIUM CHLORIDE 125 ML/HR: 600; 310; 30; 20 INJECTION, SOLUTION INTRAVENOUS at 06:18

## 2023-01-19 RX ADMIN — SODIUM CITRATE AND CITRIC ACID MONOHYDRATE 30 ML: 500; 334 SOLUTION ORAL at 14:50

## 2023-01-19 RX ADMIN — SODIUM CHLORIDE, POTASSIUM CHLORIDE, SODIUM LACTATE AND CALCIUM CHLORIDE: 600; 310; 30; 20 INJECTION, SOLUTION INTRAVENOUS at 14:45

## 2023-01-19 RX ADMIN — ROPIVACAINE HYDROCHLORIDE 15 ML/HR: 2 INJECTION, SOLUTION EPIDURAL; INFILTRATION at 14:37

## 2023-01-19 RX ADMIN — SODIUM CHLORIDE, POTASSIUM CHLORIDE, SODIUM LACTATE AND CALCIUM CHLORIDE: 600; 310; 30; 20 INJECTION, SOLUTION INTRAVENOUS at 15:23

## 2023-01-19 RX ADMIN — BUTORPHANOL TARTRATE 2 MG: 2 INJECTION, SOLUTION INTRAMUSCULAR; INTRAVENOUS at 07:22

## 2023-01-19 RX ADMIN — FAMOTIDINE 20 MG: 10 INJECTION INTRAVENOUS at 15:15

## 2023-01-19 RX ADMIN — SODIUM CHLORIDE, POTASSIUM CHLORIDE, SODIUM LACTATE AND CALCIUM CHLORIDE 125 ML/HR: 600; 310; 30; 20 INJECTION, SOLUTION INTRAVENOUS at 11:11

## 2023-01-19 NOTE — ANESTHESIA POSTPROCEDURE EVALUATION
Patient: Layne Aguilar    Procedure Summary     Date: 23 Room / Location: [facility] LABOR DELIVERY   [facility]OR [facility]IV[facility]    Anesthesia Start: 913 Anesthesia Stop:     Procedure:  SECTION PRIMARY (Abdomen) Diagnosis:     Surgeons: Mukesh Henry MD Provider: Digna Jones DO    Anesthesia Type: epidural ASA Status: 3          Anesthesia Type: epidural    Vitals  Vitals Value Taken Time   /79 23 1440   Temp 97.8 °F (36.6 °C) 23 1355   Pulse 90 23 1622   Resp 18 23 1355   SpO2 100 % 23 1622   Vitals shown include unvalidated device data.  HR 97  T 97.7  RR 15  %  /86      Post Anesthesia Care and Evaluation    Patient location during evaluation: bedside  Patient participation: complete - patient participated  Level of consciousness: awake and awake and alert  Pain score: 0  Pain management: satisfactory to patient    Airway patency: patent  Anesthetic complications: No anesthetic complications  PONV Status: none  Cardiovascular status: acceptable, hemodynamically stable and stable  Respiratory status: acceptable  Hydration status: stable  Post Neuraxial Block status: No signs or symptoms of PDPH

## 2023-01-19 NOTE — ANESTHESIA PROCEDURE NOTES
Labor Epidural      Patient reassessed immediately prior to procedure    Patient location during procedure: floor  Performed By  CRNA/XI: Naomi Solorzano CRNA  Preanesthetic Checklist  Completed: patient identified, IV checked, site marked, risks and benefits discussed, surgical consent, monitors and equipment checked, pre-op evaluation and timeout performed  Additional Notes  Dural puncture epidural 25g supa  Prep:  Pt Position:sitting  Sterile Tech:cap, gloves, mask and sterile barrier  Prep:DuraPrep  Monitoring:blood pressure monitoring  Epidural Block Procedure:  Approach:midline  Guidance:landmark technique and palpation technique  Location:L3-L4  Needle Type:Tuohy  Needle Gauge:17 G  Loss of Resistance Medium: air  Loss of Resistance: 9cm  Cath Depth at skin:15 cm  Paresthesia: none  Aspiration:negative  Test Dose:negative  Number of Attempts: 1  Post Assessment:  Dressing:occlusive dressing applied and secured with tape  Pt Tolerance:patient tolerated the procedure well with no apparent complications  Complications:no

## 2023-01-20 ENCOUNTER — PATIENT MESSAGE (OUTPATIENT)
Dept: LABOR AND DELIVERY | Facility: HOSPITAL | Age: 28
End: 2023-01-20
Payer: COMMERCIAL

## 2023-01-20 ENCOUNTER — PATIENT OUTREACH (OUTPATIENT)
Dept: LABOR AND DELIVERY | Facility: HOSPITAL | Age: 28
End: 2023-01-20
Payer: COMMERCIAL

## 2023-01-20 LAB
BASOPHILS # BLD AUTO: 0.04 10*3/MM3 (ref 0–0.2)
BASOPHILS NFR BLD AUTO: 0.3 % (ref 0–1.5)
DEPRECATED RDW RBC AUTO: 43.1 FL (ref 37–54)
EOSINOPHIL # BLD AUTO: 0.11 10*3/MM3 (ref 0–0.4)
EOSINOPHIL NFR BLD AUTO: 0.9 % (ref 0.3–6.2)
ERYTHROCYTE [DISTWIDTH] IN BLOOD BY AUTOMATED COUNT: 13.7 % (ref 12.3–15.4)
HCT VFR BLD AUTO: 30.4 % (ref 34–46.6)
HGB BLD-MCNC: 9.9 G/DL (ref 12–15.9)
IMM GRANULOCYTES # BLD AUTO: 0.07 10*3/MM3 (ref 0–0.05)
IMM GRANULOCYTES NFR BLD AUTO: 0.6 % (ref 0–0.5)
LYMPHOCYTES # BLD AUTO: 1.78 10*3/MM3 (ref 0.7–3.1)
LYMPHOCYTES NFR BLD AUTO: 14.7 % (ref 19.6–45.3)
MCH RBC QN AUTO: 28.4 PG (ref 26.6–33)
MCHC RBC AUTO-ENTMCNC: 32.6 G/DL (ref 31.5–35.7)
MCV RBC AUTO: 87.4 FL (ref 79–97)
MONOCYTES # BLD AUTO: 0.75 10*3/MM3 (ref 0.1–0.9)
MONOCYTES NFR BLD AUTO: 6.2 % (ref 5–12)
NEUTROPHILS NFR BLD AUTO: 77.3 % (ref 42.7–76)
NEUTROPHILS NFR BLD AUTO: 9.36 10*3/MM3 (ref 1.7–7)
NRBC BLD AUTO-RTO: 0 /100 WBC (ref 0–0.2)
PLATELET # BLD AUTO: 162 10*3/MM3 (ref 140–450)
PMV BLD AUTO: 11.4 FL (ref 6–12)
RBC # BLD AUTO: 3.48 10*6/MM3 (ref 3.77–5.28)
WBC NRBC COR # BLD: 12.11 10*3/MM3 (ref 3.4–10.8)

## 2023-01-20 PROCEDURE — 0503F POSTPARTUM CARE VISIT: CPT | Performed by: NURSE PRACTITIONER

## 2023-01-20 PROCEDURE — 25010000002 ENOXAPARIN PER 10 MG: Performed by: OBSTETRICS & GYNECOLOGY

## 2023-01-20 PROCEDURE — 85025 COMPLETE CBC W/AUTO DIFF WBC: CPT | Performed by: OBSTETRICS & GYNECOLOGY

## 2023-01-20 PROCEDURE — 25010000002 KETOROLAC TROMETHAMINE PER 15 MG: Performed by: OBSTETRICS & GYNECOLOGY

## 2023-01-20 RX ORDER — ENOXAPARIN SODIUM 100 MG/ML
40 INJECTION SUBCUTANEOUS EVERY 24 HOURS
Status: DISCONTINUED | OUTPATIENT
Start: 2023-01-20 | End: 2023-01-21 | Stop reason: HOSPADM

## 2023-01-20 RX ADMIN — SIMETHICONE 80 MG: 80 TABLET, CHEWABLE ORAL at 21:03

## 2023-01-20 RX ADMIN — ACETAMINOPHEN 650 MG: 325 TABLET ORAL at 20:55

## 2023-01-20 RX ADMIN — KETOROLAC TROMETHAMINE 15 MG: 15 INJECTION, SOLUTION INTRAMUSCULAR; INTRAVENOUS at 00:07

## 2023-01-20 RX ADMIN — ACETAMINOPHEN 1000 MG: 500 TABLET, FILM COATED ORAL at 02:58

## 2023-01-20 RX ADMIN — DOCUSATE SODIUM 100 MG: 100 CAPSULE, LIQUID FILLED ORAL at 20:56

## 2023-01-20 RX ADMIN — SODIUM CHLORIDE, POTASSIUM CHLORIDE, SODIUM LACTATE AND CALCIUM CHLORIDE 1000 ML: 600; 310; 30; 20 INJECTION, SOLUTION INTRAVENOUS at 05:41

## 2023-01-20 RX ADMIN — KETOROLAC TROMETHAMINE 15 MG: 15 INJECTION, SOLUTION INTRAMUSCULAR; INTRAVENOUS at 17:16

## 2023-01-20 RX ADMIN — PRENATAL VITAMINS-IRON FUMARATE 27 MG IRON-FOLIC ACID 0.8 MG TABLET 1 TABLET: at 08:08

## 2023-01-20 RX ADMIN — ACETAMINOPHEN 1000 MG: 500 TABLET, FILM COATED ORAL at 14:26

## 2023-01-20 RX ADMIN — DOCUSATE SODIUM 100 MG: 100 CAPSULE, LIQUID FILLED ORAL at 08:08

## 2023-01-20 RX ADMIN — ACETAMINOPHEN 1000 MG: 500 TABLET, FILM COATED ORAL at 08:08

## 2023-01-20 RX ADMIN — KETOROLAC TROMETHAMINE 15 MG: 15 INJECTION, SOLUTION INTRAMUSCULAR; INTRAVENOUS at 11:53

## 2023-01-20 RX ADMIN — ENOXAPARIN SODIUM 40 MG: 40 INJECTION SUBCUTANEOUS at 11:53

## 2023-01-20 RX ADMIN — KETOROLAC TROMETHAMINE 15 MG: 15 INJECTION, SOLUTION INTRAMUSCULAR; INTRAVENOUS at 06:33

## 2023-01-20 NOTE — OUTREACH NOTE
[facility]  IP Postpartum    Questions/Answers    Flowsheet Row Responses   Best Method for Contacting Cell   Support Person Present Yes   Does the patient have a car seat at the hospital Yes   Delivery Note Reviewed Reviewed   Were birth expectations met? Yes   Is there a need for additional support/resources? No   Lactation Note Reviewed Not Reviewed   Not Reviewed Comment Not breastfeeding in the hospital   Is additional support needed? Other   Other Comments Possibly. Has breastpump and planing to pump and feed when she oges home. Provided number for outpatient lactation clinic for assistance if needed.   Any questions or concerns? No   Is the patient going to use Meds to Beds? Yes   Any concerns related discharge meds/ability to  prescriptions? No   OB Discharge Navigator Reviewed  Not Reviewed   Comment Not completed yet   Confirm Postpartum OB appointment No   Confirm initial well-child Pediatrician appointment date/time: No   Additional post-discharge F/U appointments No   Does patient have transportation to appointments? Yes   Any other assistance needed to ensure she is able to attend appointments? No   Does patient have supplies needed at home for  care? Breast Pump, Clothing, Crib, Diapers          Unable to talk with Layne during pregnancy. She is feeling well after delivery. She has everything she needs for baby but she is still working on selecting a pediatrician for f/u care. She is enrolled in WIC and has a phone appointment with them next week. She is also enrolled in [employer]. Has been bottle feeding and states she pans to breastfeed once she goes home. She has her breast pump with her. Provided with outpatient lactation clinic number in case she needs assistance after going home.    No questions or concerns at present. Will f/u .    Referral submitted to the following resources (verbal consent received to submit demographic information):     VERNON Gallego  RN  Maternity Nurse Navigator    1/20/2023, 10:28 EST

## 2023-01-20 NOTE — ANESTHESIA POSTPROCEDURE EVALUATION
Patient: Layne Aguilar    Procedure Summary     Date: 23 Room / Location: [facility] LABOR DELIVERY   [facility]OR [facility]    Anesthesia Start: 913 Anesthesia Stop:     Procedure:  SECTION PRIMARY (Abdomen) Diagnosis:     Surgeons: Mukesh Henry MD Provider: Digna Jones DO    Anesthesia Type: epidural ASA Status: 3          Anesthesia Type: epidural    Vitals  Vitals Value Taken Time   /58 23 0300   Temp 97.8 °F (36.6 °C) 23 0300   Pulse 87 23 0300   Resp 18 23 0300   SpO2 99 % 23 1735   Vitals shown include unvalidated device data.        Post Anesthesia Care and Evaluation    Patient location during evaluation: bedside  Patient participation: complete - patient participated  Level of consciousness: awake and alert  Pain management: adequate    Airway patency: patent  Anesthetic complications: No anesthetic complications    Cardiovascular status: acceptable  Respiratory status: acceptable  Hydration status: acceptable  Post Neuraxial Block status: Motor and sensory function returned to baseline and No signs or symptoms of PDPH

## 2023-01-21 VITALS
HEART RATE: 85 BPM | SYSTOLIC BLOOD PRESSURE: 125 MMHG | HEIGHT: 63 IN | TEMPERATURE: 98 F | BODY MASS INDEX: 51.91 KG/M2 | OXYGEN SATURATION: 99 % | DIASTOLIC BLOOD PRESSURE: 83 MMHG | RESPIRATION RATE: 18 BRPM | WEIGHT: 293 LBS

## 2023-01-21 PROCEDURE — 25010000002 ENOXAPARIN PER 10 MG: Performed by: OBSTETRICS & GYNECOLOGY

## 2023-01-21 PROCEDURE — 0503F POSTPARTUM CARE VISIT: CPT | Performed by: NURSE PRACTITIONER

## 2023-01-21 RX ORDER — OXYCODONE HYDROCHLORIDE 5 MG/1
5 TABLET ORAL EVERY 4 HOURS PRN
Qty: 18 TABLET | Refills: 0 | Status: SHIPPED | OUTPATIENT
Start: 2023-01-21 | End: 2023-01-24

## 2023-01-21 RX ORDER — IBUPROFEN 600 MG/1
600 TABLET ORAL EVERY 6 HOURS
Qty: 60 TABLET | Refills: 0 | Status: SHIPPED | OUTPATIENT
Start: 2023-01-21

## 2023-01-21 RX ADMIN — ACETAMINOPHEN 650 MG: 325 TABLET ORAL at 03:01

## 2023-01-21 RX ADMIN — ACETAMINOPHEN 650 MG: 325 TABLET ORAL at 09:30

## 2023-01-21 RX ADMIN — ENOXAPARIN SODIUM 40 MG: 40 INJECTION SUBCUTANEOUS at 09:30

## 2023-01-21 RX ADMIN — ACETAMINOPHEN 650 MG: 325 TABLET ORAL at 15:54

## 2023-01-21 RX ADMIN — SIMETHICONE 80 MG: 80 TABLET, CHEWABLE ORAL at 09:30

## 2023-01-21 RX ADMIN — IBUPROFEN 600 MG: 600 TABLET ORAL at 12:05

## 2023-01-21 RX ADMIN — IBUPROFEN 600 MG: 600 TABLET ORAL at 06:05

## 2023-01-21 RX ADMIN — PRENATAL VITAMINS-IRON FUMARATE 27 MG IRON-FOLIC ACID 0.8 MG TABLET 1 TABLET: at 09:30

## 2023-01-21 RX ADMIN — IBUPROFEN 600 MG: 600 TABLET ORAL at 00:18

## 2023-01-21 RX ADMIN — DOCUSATE SODIUM 100 MG: 100 CAPSULE, LIQUID FILLED ORAL at 09:30

## 2023-01-23 ENCOUNTER — TELEPHONE (OUTPATIENT)
Dept: OBSTETRICS AND GYNECOLOGY | Facility: CLINIC | Age: 28
End: 2023-01-23

## 2023-01-23 NOTE — TELEPHONE ENCOUNTER
Caller: Layne Aguilar    Relationship to patient: Self    Best call back number: [phone]    Patient is needing: PT NEEDS TO SCHEDULE STAPLE REMOVAL WITH DR. LORD TOMORROW 1/24/23 AFTER 10:45 BEFORE 12 IF POSSIBLE. PT OK TO GO TO [place] OFFICE IF NEEDED. PT DELIVERED ON 1/19/23. PLEASE CALL PT TO SCHEDULE APPT.

## 2023-01-24 ENCOUNTER — POSTPARTUM VISIT (OUTPATIENT)
Dept: OBSTETRICS AND GYNECOLOGY | Facility: CLINIC | Age: 28
End: 2023-01-24
Payer: COMMERCIAL

## 2023-01-24 VITALS
SYSTOLIC BLOOD PRESSURE: 120 MMHG | DIASTOLIC BLOOD PRESSURE: 76 MMHG | BODY MASS INDEX: 51.91 KG/M2 | HEIGHT: 63 IN | WEIGHT: 293 LBS

## 2023-01-24 PROCEDURE — 0503F POSTPARTUM CARE VISIT: CPT | Performed by: NURSE PRACTITIONER

## 2023-01-24 NOTE — PROGRESS NOTES
"      Chief Complaint   Patient presents with   • Postpartum Care       5 day Postpartum Visit         Layne Aguialr is a 27 y.o.  who presents today for a 5 day post partum suture removal from a Low Transverse . Patient states her lower extremities have been staying swollen since giving birth.       , Low Transverse    Information for the patient's :  Nitish Aguilar [[id number]]   2023   female   Nitish Aguilar   3350 g (7 lb 6.2 oz)   Gestational Age: 40w3d      Baby Discharged with Mom  Delivering MD: Mukesh Henry MD.    Pregnancy complications: no known issues    Patient reports her incision is clean, dry, intact. Patient describes vaginal bleeding as moderate.  She is breast feeding. Patient denies bowel or bladder issues.    She desires contraceptive methods: patient is undecided at this time would like to think about  for contraception.    Patient denies postpartum depression. Postpartum Depression Screening Questionnaire: 5 ,  Treatment is not  indicated.      The additional following portions of the patient's history were reviewed and updated as appropriate: allergies, current medications, past family history, past medical history, past social history, past surgical history and problem list.      Review of Systems   Constitutional: Negative.    Cardiovascular: Positive for leg swelling.   Gastrointestinal: Negative.    Genitourinary: Negative.    All other systems reviewed and are negative.      I have reviewed and agree with the HPI, ROS, and historical information as entered above. Rose Lyons, EDUARD    Objective   /76   Ht 160 cm (63\")   Wt (!) 165 kg (363 lb 3.2 oz)   LMP 2022   Breastfeeding Yes Comment: pumping 3 times a day  BMI 64.34 kg/m²     Physical Exam  Vitals and nursing note reviewed.   Constitutional:       Appearance: Normal appearance. She is obese.   Abdominal:      Palpations: Abdomen is soft.      Tenderness: There is " no abdominal tenderness.      Comments:  CDI without redness    Area cleaned prior to staple removal today.     Staples removed and steri strips applied   Neurological:      Mental Status: She is alert.              Assessment and Plan    Problem List Items Addressed This Visit    None  Visit Diagnoses     Postpartum follow-up    -  Primary          1. S/p ,  5 days  postpartum.  Doing well.    2. Continued pelvic rest with a return to driving and light physical activity.  3. Baby doing well.  4. Breastfeeding going well.  5. No si/sx of postpartum depression   6. Pt. Here for staple removal today. Incision CDI without redness.   7. RTC in 2 weeks for post partum follow up.         Rose Lyons, APRN  2023

## 2023-01-26 ENCOUNTER — TELEPHONE (OUTPATIENT)
Dept: OBSTETRICS AND GYNECOLOGY | Facility: CLINIC | Age: 28
End: 2023-01-26
Payer: COMMERCIAL

## 2023-01-26 NOTE — TELEPHONE ENCOUNTER
TULIO PHAN    [phone]    PT HAD C SECTION 1/19/23    HAVING SWELLING ON FEET & ANKLE MOSTLY LEFT SIDE SINCE 1/19/23

## 2023-01-26 NOTE — TELEPHONE ENCOUNTER
EDUARD Loyns advised after having a  the swelling will get worse before it gets better. It takes 2.5-3 weeks before it completely resolves due to all the IV fluids giving during the . I called and and spoke to the patient to advise her of this. Also advised if it does not resolve within that time frame or if she has any other issues to give us a call. Patient understood.

## 2023-01-27 ENCOUNTER — PATIENT OUTREACH (OUTPATIENT)
Dept: LABOR AND DELIVERY | Facility: HOSPITAL | Age: 28
End: 2023-01-27
Payer: COMMERCIAL

## 2023-01-27 NOTE — OUTREACH NOTE
Motherhood Connection  Postpartum Check-In    Questions/Answers    Flowsheet Row Responses   Visit Setting Telephone   Best Method for Contacting Cell   OB Discharge Note Reviewed  Reviewed   OB Discharge Navigator Reviewed  Reviewed   OB Discharge Medications Reviewed  Reviewed   [place] discharged home with mother? Yes   Current Pain Levels 0-10 3   Pain level with activity 0-10 4   Pain Location Abdomen   Pain Description Incisional   How do you treat your pain? Medications  [Ibuprofen]   Verbalized Emotional State Acceptance   Family/Support Network Significant Other   Level of Involvement in Care Supportive, Interactive   Do you feel comfortable in your relationship with your baby? Yes   Have members of your household adjusted to your baby? Yes   Is the baby's father supportive and/or involved with the baby? Yes   How does your partner feel about the baby? Involved, Happy   Do you feel safe at home, school and work? Yes   Are you in a relationship with someone who threatens you or hurts you? No   Do you have the resources to keep yourself and your baby healthy and safe? Yes   Lochia (per patient report) Rubra   Amount Scant   Number of pads per day 3   Lochia Odor None   Is patient breastfeeding? Yes, pumping   Breast Care Supportive Bra   pumping - Left Breast Nontender   Pumping - Right Breast Nontender   Pumping - Left Nipple Intact   Pumping - Right Nipple Intact   Frequency 2-3 times per day   Pumping Quantity 3oz   Storage of Milk refrigerator   Postpartum Depression Screening Education Education Provided   Doctor Appointments: Education Provided   Breastfeeding Education Education Provided   Family Planning Education Education Provided   Postpartum Care Education Education Provided   S & S to report Education Provided   Followup Appointments Made Yes   Well Child Visit Appointments Made Yes   Appointment Date 23   Provider/Agency Dr. Henry   Well Child Checkup Provider Name  Peds   Well Child  Check Up Date: 23   Did you complete the visit? Yes   Were there any specific concerns? No   Umbilical Cord No reported signs or symptoms   Infant Feeding Method Expressed Breast Milk, Formula   Is a lactation referral indicated? No   Formula PO (mL) 90   Formula/Expressed Milk frequency of feedings: Q3H   Expressed milk PO (mL) 90   Expressed milk- frequency of feedings at bedtime and night   Number of wet diapers x 24 hours 8   Last BM x 24 hours 4   Emesis (Unmeasured Occurence) no   What safe sleep surface is available? Bassinet   Are there stuffed animals, toys, pillows, quilts, blankets, wedges, positioners, bumpers or other loose bedding in the infant's sleeping environment? No   Where does the baby usually sleep? Bassinet   Does the baby ever share a sleep surface with a sibling, adult or pet? No   Does the baby ever share a sleep surface in a bed, couch, recliner or other? No   What position do you place your baby to sleep for naps? Back   What position do you place your baby to sleep at night Back   Is the infant dressed appropiately for the temperature of the home? Yes   Do you use a clean, dry pacifier that is not attached to a string or stuffed animal? Yes   Comment No string while sleeping          Review of Systems    [place]  Depression Score: 5 (2023 11:28 AM)      Layne is feeling pretty good after going home. She does have some swelling but she talked with the office about it and was reassured it is normal, She rates her pain 3-4 out of 10 and states Ibuprofen helps. Lochia is very light. She is pumping to feed baby at night and uses formula during the day. No concerns at ped f/u visit.     IP EPDS=5, PP EPDS=5. She has had issues with depression in the past but states overall, she feels better than usual relating to mood since having the baby.     No questions or concerns. 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      Joslyn Gallego, RN  Maternity Nurse Navigator    2023,  11:59 EST

## 2023-02-03 ENCOUNTER — PATIENT OUTREACH (OUTPATIENT)
Dept: CALL CENTER | Facility: HOSPITAL | Age: 28
End: 2023-02-03
Payer: COMMERCIAL

## 2023-02-03 NOTE — OUTREACH NOTE
Motherhood Connection Survey    Flowsheet Row Responses   [facility] patient discharged from? [facility]   Week 1 attempt successful? Yes   Call start time 1534   Call end time 1541   Baby sex Girl    discharged home with mother? Yes   Baby sex Girl   Delivery type    Emotional state Acceptance   Family support Yes   Do you have all necessary resources to care for you and your baby?  Yes   Have members of your household adjusted to your baby? Yes   Did you have any problems with pre-eclampsia during this pregnancy? No   Did you have blood glucose issues during this pregnancy No   Lochia amount Light   Lochia per patient report Serosa   Did you have an episiotomy/tear/abdominal incision? Yes   Additional comments incision healing well   Feeding Method Breast   Frequency not attaching, just taking MBM in bottle   Duration q 2.5-3.5 hrs   Pumping Yes   Supplementing Formula, Breast Milk   Signs baby is ready to eat Rooting, Crying   Number of wet diapers x 24 hours 8-12   Last BM x 24 hours 3-6   Umbilical Cord No reported signs or symptoms   Umbilical cord comments cord off   Where does the baby usually sleep? Bassinet   Are there stuffed animals, toys, pillows, quilts, blankets, wedges, positioners, bumpers or other loose bedding in the infant's sleeping environment? No   Does the baby ever share a sleep surface in a bed, couch, recliner or other? No   What position do you lay your baby down to sleep? Back   Are you and/or other caregivers smoking inside or outside the baby's home? No   Mom appointment comments: had f/u's, staples out   Baby appointment comments: Peds visits x2, gaining weight   Feeding method comment 3-4 oz   Call completed? Yes   How satisfied were you with the Motherhood Connection Program? 5            POONAM GARCÍA - Registered Nurse

## 2023-02-14 ENCOUNTER — POSTPARTUM VISIT (OUTPATIENT)
Dept: OBSTETRICS AND GYNECOLOGY | Facility: CLINIC | Age: 28
End: 2023-02-14
Payer: COMMERCIAL

## 2023-02-14 VITALS
DIASTOLIC BLOOD PRESSURE: 75 MMHG | SYSTOLIC BLOOD PRESSURE: 112 MMHG | WEIGHT: 293 LBS | HEIGHT: 63 IN | BODY MASS INDEX: 51.91 KG/M2

## 2023-02-14 PROCEDURE — 0503F POSTPARTUM CARE VISIT: CPT | Performed by: NURSE PRACTITIONER

## 2023-02-14 NOTE — PROGRESS NOTES
"        Chief Complaint   Patient presents with   • Postpartum Care       4 Week Postpartum Visit         Layne Aguilar is a 27 y.o.  who presents today for a 4 week postpartum check.     C/S: Primary for failure to progress and failure to descend      , Low Transverse    Information for the patient's :  Martinez Kate [[id number]]   2023   female   Martinez Kate   3350 g (7 lb 6.2 oz)   Gestational Age: 40w3d          Baby Discharged: Discharged with Mom  Delivering Physician: Mukesh Henry MD    At the time of delivery were you diagnosed with any of the following: None. The incision is healing well. Patient describes vaginal bleeding as absent.  Patient is breast pumping and bottle feeding formula.  She is unsure at this time of what she would like to use for contraception.  Patient denies bowel or bladder issues. She states she is taking a stool softner which has helped resolve constipation issues.      Patient denies postpartum depression. Postpartum Depression Screening Questionnaire: 4, NO treatment indicated.      Last Pap : 2022. Results: negative. HPV: negative.   Last Completed Pap Smear          PAP SMEAR (Every 3 Years) Next due on 2022  LIQUID-BASED PAP SMEAR, [facility] ([facility])                  The additional following portions of the patient's history were reviewed and updated as appropriate: allergies, current medications, past family history, past medical history, past social history, past surgical history and problem list.    Review of Systems   Constitutional: Negative.    Cardiovascular: Negative.    Gastrointestinal: Negative.    Genitourinary: Negative.    Psychiatric/Behavioral: Negative.      All other systems reviewed and are negative.     I have reviewed and agree with the HPI, ROS, and historical information as entered above. Rose Lyons, APRN    /75   Ht 160 cm (63\")   Wt (!) 153 kg (337 lb 12.8 oz)   LMP " 2022   Breastfeeding Yes   BMI 59.84 kg/m²     Physical Exam  Vitals and nursing note reviewed.   Constitutional:       Appearance: Normal appearance. She is obese.   Abdominal:      Palpations: Abdomen is soft.      Tenderness: There is no abdominal tenderness.      Comments:  incision CDI without redness    Neurological:      Mental Status: She is alert.             Assessment and Plan    Problem List Items Addressed This Visit    None  Visit Diagnoses     Postpartum follow-up    -  Primary          1. S/p , 4 weeks postpartum.  Doing well.  2. Breast pumping and bottle feeding too    3. Continue pelvic rest for 2 more weeks. Avoid heavy lifting  4. Baby doing well.  5. No si/sx of postpartum depression  6. Contraception: contraceptive methods: undecided  7. RTC in 2 weeks for 6 week post partum visit with Dr.Parrott Rose Lyons, APRN  2023

## 2023-02-28 ENCOUNTER — POSTPARTUM VISIT (OUTPATIENT)
Dept: OBSTETRICS AND GYNECOLOGY | Facility: CLINIC | Age: 28
End: 2023-02-28
Payer: COMMERCIAL

## 2023-02-28 VITALS
DIASTOLIC BLOOD PRESSURE: 78 MMHG | WEIGHT: 293 LBS | HEIGHT: 63 IN | BODY MASS INDEX: 51.91 KG/M2 | SYSTOLIC BLOOD PRESSURE: 126 MMHG

## 2023-02-28 PROCEDURE — 0503F POSTPARTUM CARE VISIT: CPT | Performed by: NURSE PRACTITIONER

## 2023-02-28 NOTE — PROGRESS NOTES
"        Chief Complaint   Patient presents with   • Postpartum Care     6wk       6 Week Postpartum Visit         Layne Aguilar is a 27 y.o.  who presents today for a 6 week postpartum check.     C/S: primary for failure to progress and failure to descend     , Low Transverse    Information for the patient's :  Martinez Kate [[id number]]   2023   female   Martinez Kate   3350 g (7 lb 6.2 oz)   Gestational Age: 40w3d          Baby Discharged: Discharged with Mom  Delivering Physician: Mukesh Henry MD    At the time of delivery were you diagnosed with any of the following: None. The incision is healing well. Patient describes vaginal bleeding as absent.  Patient is bottle feeding  She desires contraceptive methods: None for contraception.  Patient denies bowel or bladder issues.      Patient denies postpartum depression. Postpartum Depression Screening Questionnaire: completed, 1, treatment  Is not indicated.      Last Pap : 2022. Results: negative. HPV: negative.   Last Completed Pap Smear          PAP SMEAR (Every 3 Years) Next due on 2022  LIQUID-BASED PAP SMEAR, [facility] ([facility])                  The additional following portions of the patient's history were reviewed and updated as appropriate: allergies, current medications, past family history, past medical history, past social history, past surgical history and problem list.    Review of Systems   Constitutional: Negative.    Cardiovascular: Negative.    Gastrointestinal: Negative.    Genitourinary: Negative.    Psychiatric/Behavioral: Negative.    All other systems reviewed and are negative.    All other systems reviewed and are negative.     I have reviewed and agree with the HPI, ROS, and historical information as entered above. Rose Lyons, EDUARD    /78   Ht 160 cm (62.99\")   Wt (!) 156 kg (344 lb 9.6 oz)   LMP 2022   Breastfeeding No   BMI 61.06 kg/m²     Physical " Exam  Vitals and nursing note reviewed.   Constitutional:       Appearance: Normal appearance. She is obese.   Abdominal:      Palpations: Abdomen is soft.      Tenderness: There is no abdominal tenderness.      Comments:  incision CDI without redness   Neurological:      Mental Status: She is alert.             Assessment and Plan    Problem List Items Addressed This Visit    None  Visit Diagnoses     Postpartum follow-up    -  Primary          1. S/p , 6 weeks postpartum.  Doing well.    2. Return to normal physical activity.  No pelvic restrictions.   3. Baby doing well.  4. Bottlefeeding going well.  5. No si/sx of postpartum depression  6. Contraception: contraceptive methods: Condoms  7.   RTC in one year with Dr.Parrott Rose Lyons, APRN  2023

## 2023-05-01 ENCOUNTER — TELEPHONE (OUTPATIENT)
Dept: OBSTETRICS AND GYNECOLOGY | Facility: CLINIC | Age: 28
End: 2023-05-01
Payer: COMMERCIAL

## 2023-05-01 NOTE — TELEPHONE ENCOUNTER
Patients mother haylee is calling on behalf of patient she feels lisa is suffering from postpartum depression and would like to discuss

## 2023-05-01 NOTE — TELEPHONE ENCOUNTER
Discussed with EDUARD Shi. Can see patient here to evaluate. Patient v/u and agreed. She requested appointment for next week. Scheduled for 5/9/23.

## 2023-05-01 NOTE — TELEPHONE ENCOUNTER
Returned call. Patient's mom states she thinks patient has PP depression. Patient moved from her mother's home to FOB/his mom's home after delivery.  patient is crying all the time and having separation anxiety after returned to work 6 weeks PP.  patient has not voiced any thoughts of hurting self or others and recently starting taking Ashwaganda for symptoms. Had PCS 1/19/23.   Called patient. Denies any thoughts of harming self or others. Has negative thoughts that she isn't doing enough. Crying and feeling overwhelmed since returned to work.  is able to care for baby and self and perform daily activities but can't control crying. She is trying to find a counselor to see.  current environment is good and she has good support.

## 2023-05-09 ENCOUNTER — POSTPARTUM VISIT (OUTPATIENT)
Dept: OBSTETRICS AND GYNECOLOGY | Facility: CLINIC | Age: 28
End: 2023-05-09
Payer: COMMERCIAL

## 2023-05-09 VITALS
WEIGHT: 293 LBS | DIASTOLIC BLOOD PRESSURE: 80 MMHG | SYSTOLIC BLOOD PRESSURE: 128 MMHG | BODY MASS INDEX: 51.91 KG/M2 | HEIGHT: 63 IN

## 2023-05-09 PROCEDURE — 99212 OFFICE O/P EST SF 10 MIN: CPT | Performed by: OBSTETRICS & GYNECOLOGY

## 2023-05-09 RX ORDER — ESCITALOPRAM OXALATE 10 MG/1
10 TABLET ORAL DAILY
Qty: 30 TABLET | Refills: 2 | Status: SHIPPED | OUTPATIENT
Start: 2023-05-09 | End: 2024-05-08

## 2023-05-09 NOTE — PROGRESS NOTES
"      Chief Complaint   Patient presents with   • Postpartum Care     PP depression       Postpartum problems visit       Layne Aguilar is a 27 y.o.  who is s/p  on 23.    , Low Transverse    Information for the patient's :  Nitish Aguilar [[id number]]   2023   female   Nitish Aguilar   3350 g (7 lb 6.2 oz)   Gestational Age: 40w3d      Baby Discharged with Mom  Delivering MD: Mukesh Henry MD.      She presents today for concerns for postpartum depression..    The patient complains of concerns for postpartum depression. She denies thoughts of harming herself or others.     Patient describes bleeding as absent.  Patient is bottle feeding.  denies bowel or bladder issues.    Patient reports postpartum depression. Postpartum Depression Screening Questionnaire: 13. Pt states she feels \"overwhelmed and doesn't know what's wrong. Pt states she is crying for no reason since she has been back to work. Pt states she does have separation anxiety from baby and feels like world is getting on top of her. Pt states she has good support system at home and FOB's mom takes care of baby while patient at work.     The additional following portions of the patient's history were reviewed and updated as appropriate: allergies, current medications, past family history, past medical history, past social history, past surgical history and problem list.      Review of Systems   Constitutional: Negative.    HENT: Negative.    Eyes: Negative.    Respiratory: Negative.    Cardiovascular: Negative.    Gastrointestinal: Negative.    Endocrine: Negative.    Genitourinary: Negative.    Musculoskeletal: Negative.    Skin: Negative.    Allergic/Immunologic: Negative.    Neurological: Negative.    Hematological: Negative.    Psychiatric/Behavioral: Positive for depressed mood and stress. The patient is nervous/anxious.        I have reviewed and agree with the HPI, ROS, and historical information " "as entered above. Mukesh Henry MD    Objective   /80   Ht 160 cm (63\")   Wt (!) 152 kg (335 lb 6.4 oz)   LMP 2023 (Exact Date)   BMI 59.41 kg/m²     Physical Exam not done          Assessment and Plan    Problem List Items Addressed This Visit    None  Visit Diagnoses     Postpartum depression    -  Primary    Relevant Medications    escitalopram (Lexapro) 10 MG tablet          1. S/p ,     2. Continued pelvic rest.   3. Contraception: contraceptive methods: Condoms  Return if symptoms worsen or fail to improve, for Annual physical.    Mukesh eHnry MD  2023  "

## 2023-10-11 ENCOUNTER — OFFICE VISIT (OUTPATIENT)
Dept: FAMILY MEDICINE CLINIC | Facility: CLINIC | Age: 28
End: 2023-10-11
Payer: MEDICAID

## 2023-10-11 VITALS
OXYGEN SATURATION: 99 % | WEIGHT: 293 LBS | SYSTOLIC BLOOD PRESSURE: 118 MMHG | BODY MASS INDEX: 51.91 KG/M2 | RESPIRATION RATE: 16 BRPM | HEIGHT: 63 IN | TEMPERATURE: 97.3 F | HEART RATE: 63 BPM | DIASTOLIC BLOOD PRESSURE: 80 MMHG

## 2023-10-11 DIAGNOSIS — J40 BRONCHITIS: Primary | ICD-10-CM

## 2023-10-11 PROCEDURE — 99213 OFFICE O/P EST LOW 20 MIN: CPT | Performed by: PHYSICIAN ASSISTANT

## 2023-10-11 RX ORDER — ALBUTEROL SULFATE 90 UG/1
2 AEROSOL, METERED RESPIRATORY (INHALATION) EVERY 4 HOURS PRN
Qty: 6.7 G | Refills: 1 | Status: SHIPPED | OUTPATIENT
Start: 2023-10-11

## 2023-10-11 RX ORDER — PREDNISONE 10 MG/1
10 TABLET ORAL DAILY
Qty: 14 TABLET | Refills: 0 | Status: SHIPPED | OUTPATIENT
Start: 2023-10-11

## 2023-10-11 RX ORDER — AZITHROMYCIN 250 MG/1
TABLET, FILM COATED ORAL
Qty: 6 TABLET | Refills: 0 | Status: SHIPPED | OUTPATIENT
Start: 2023-10-11

## 2023-10-11 RX ORDER — DEXTROMETHORPHAN HYDROBROMIDE AND PROMETHAZINE HYDROCHLORIDE 15; 6.25 MG/5ML; MG/5ML
2.5 SYRUP ORAL 4 TIMES DAILY PRN
Qty: 120 ML | Refills: 0 | Status: SHIPPED | OUTPATIENT
Start: 2023-10-11

## 2023-10-11 NOTE — PROGRESS NOTES
Subjective   Layne Aguilar is a 28 y.o. female  Cough (Chest congestion, productive cough for several weeks. Has been taking OTC cough syrup.)      Cough  Associated symptoms include wheezing. Pertinent negatives include no chest pain, chills, fever or shortness of breath.   Patient is a pleasant 28-year-old black female who comes in with cough congestion cough is been productive with yellow sputum symptoms been present for 3 weeks no fever no chills no nausea vomiting.  Mild sore throat    The following portions of the patient's history were reviewed and updated as appropriate: allergies, current medications, past social history and problem list    Review of Systems   Constitutional:  Negative for chills, fatigue and fever.   HENT: Negative.     Respiratory:  Positive for cough, chest tightness and wheezing. Negative for shortness of breath.    Cardiovascular:  Negative for chest pain.   Gastrointestinal:  Negative for nausea.       Objective     Vitals:    10/11/23 0935   BP: 118/80   Pulse: 63   Resp: 16   Temp: 97.3 øF (36.3 øC)   SpO2: 99%       Physical Exam  Vitals and nursing note reviewed.   Constitutional:       General: She is not in acute distress.     Appearance: She is well-developed. She is not diaphoretic.   HENT:      Head: Normocephalic and atraumatic.      Nose: Nose normal.   Neck:      Vascular: No JVD.   Cardiovascular:      Rate and Rhythm: Normal rate and regular rhythm.      Heart sounds: Normal heart sounds. No murmur heard.  Pulmonary:      Effort: Pulmonary effort is normal. No respiratory distress.      Breath sounds: No stridor. Wheezing present.   Musculoskeletal:      Cervical back: Neck supple.   Lymphadenopathy:      Cervical: No cervical adenopathy.         Assessment & Plan     Diagnoses and all orders for this visit:    1. Bronchitis (Primary)  -     promethazine-dextromethorphan (PROMETHAZINE-DM) 6.25-15 MG/5ML syrup; Take 2.5 mL by mouth 4 (Four) Times a Day As Needed for  Cough.  Dispense: 120 mL; Refill: 0  -     azithromycin (Zithromax Z-Valentin) 250 MG tablet; Take 2 tablets the first day, then 1 tablet daily for 4 days.  Dispense: 6 tablet; Refill: 0  -     albuterol sulfate  (90 Base) MCG/ACT inhaler; Inhale 2 puffs Every 4 (Four) Hours As Needed for Shortness of Air or Wheezing.  Dispense: 6.7 g; Refill: 1  -     predniSONE (DELTASONE) 10 MG tablet; Take 1 tablet by mouth Daily.  Dispense: 14 tablet; Refill: 0     I spent 15 minutes in patient care: Reviewing records prior to the visit, examining the patient, entering orders and documentation    Part of this note may be an electronic transcription/translation of spoken language to printed text using the Dragon Dictation System.

## 2023-10-31 ENCOUNTER — TELEPHONE (OUTPATIENT)
Dept: FAMILY MEDICINE CLINIC | Facility: CLINIC | Age: 28
End: 2023-10-31

## 2023-10-31 NOTE — TELEPHONE ENCOUNTER
----- Message from Layne Aguilar sent at 10/30/2023 11:30 PM EDT -----  Regarding: Chest congestion   Contact: [phone]  Would it be normal if I’m still feeling the congestion? I’ve been off my prednisone since last week.

## 2023-10-31 NOTE — TELEPHONE ENCOUNTER
She was diagnosed with bronchitis October 11 and was prescribed zpak, prednisone, promethazine DM.

## 2024-03-27 DIAGNOSIS — J45.909 ASTHMA, UNSPECIFIED ASTHMA SEVERITY, UNSPECIFIED WHETHER COMPLICATED, UNSPECIFIED WHETHER PERSISTENT: Primary | ICD-10-CM

## 2024-03-27 DIAGNOSIS — J40 BRONCHITIS: ICD-10-CM

## 2024-03-27 RX ORDER — ALBUTEROL SULFATE 90 UG/1
2 AEROSOL, METERED RESPIRATORY (INHALATION) EVERY 4 HOURS PRN
Qty: 6.7 G | Refills: 1 | Status: SHIPPED | OUTPATIENT
Start: 2024-03-27

## 2024-05-16 ENCOUNTER — OFFICE VISIT (OUTPATIENT)
Dept: PULMONOLOGY | Facility: CLINIC | Age: 29
End: 2024-05-16
Payer: MEDICAID

## 2024-05-16 VITALS
WEIGHT: 293 LBS | OXYGEN SATURATION: 96 % | DIASTOLIC BLOOD PRESSURE: 68 MMHG | BODY MASS INDEX: 53.92 KG/M2 | HEART RATE: 78 BPM | TEMPERATURE: 97 F | SYSTOLIC BLOOD PRESSURE: 122 MMHG | HEIGHT: 62 IN

## 2024-05-16 DIAGNOSIS — K21.9 GERD WITHOUT ESOPHAGITIS: ICD-10-CM

## 2024-05-16 DIAGNOSIS — E66.01 MORBID OBESITY: ICD-10-CM

## 2024-05-16 DIAGNOSIS — J45.909 ASTHMA, UNSPECIFIED ASTHMA SEVERITY, UNSPECIFIED WHETHER COMPLICATED, UNSPECIFIED WHETHER PERSISTENT: Primary | ICD-10-CM

## 2024-05-16 DIAGNOSIS — J40 BRONCHITIS: ICD-10-CM

## 2024-05-16 DIAGNOSIS — J30.9 ALLERGIC RHINITIS, UNSPECIFIED SEASONALITY, UNSPECIFIED TRIGGER: ICD-10-CM

## 2024-05-16 LAB
BASOPHILS # BLD AUTO: 0.05 10*3/MM3 (ref 0–0.2)
BASOPHILS NFR BLD AUTO: 0.6 % (ref 0–1.5)
DEPRECATED RDW RBC AUTO: 39.5 FL (ref 37–54)
EOSINOPHIL # BLD AUTO: 0.36 10*3/MM3 (ref 0–0.4)
EOSINOPHIL NFR BLD AUTO: 4.1 % (ref 0.3–6.2)
ERYTHROCYTE [DISTWIDTH] IN BLOOD BY AUTOMATED COUNT: 12.7 % (ref 12.3–15.4)
HCT VFR BLD AUTO: 41.8 % (ref 34–46.6)
HGB BLD-MCNC: 13.5 G/DL (ref 12–15.9)
IMM GRANULOCYTES # BLD AUTO: 0.02 10*3/MM3 (ref 0–0.05)
IMM GRANULOCYTES NFR BLD AUTO: 0.2 % (ref 0–0.5)
LYMPHOCYTES # BLD AUTO: 2.29 10*3/MM3 (ref 0.7–3.1)
LYMPHOCYTES NFR BLD AUTO: 26.3 % (ref 19.6–45.3)
MCH RBC QN AUTO: 28 PG (ref 26.6–33)
MCHC RBC AUTO-ENTMCNC: 32.3 G/DL (ref 31.5–35.7)
MCV RBC AUTO: 86.5 FL (ref 79–97)
MONOCYTES # BLD AUTO: 0.46 10*3/MM3 (ref 0.1–0.9)
MONOCYTES NFR BLD AUTO: 5.3 % (ref 5–12)
NEUTROPHILS NFR BLD AUTO: 5.53 10*3/MM3 (ref 1.7–7)
NEUTROPHILS NFR BLD AUTO: 63.5 % (ref 42.7–76)
NRBC BLD AUTO-RTO: 0 /100 WBC (ref 0–0.2)
PLATELET # BLD AUTO: 236 10*3/MM3 (ref 140–450)
PMV BLD AUTO: 11 FL (ref 6–12)
RBC # BLD AUTO: 4.83 10*6/MM3 (ref 3.77–5.28)
WBC NRBC COR # BLD AUTO: 8.71 10*3/MM3 (ref 3.4–10.8)

## 2024-05-16 PROCEDURE — 86003 ALLG SPEC IGE CRUDE XTRC EA: CPT | Performed by: NURSE PRACTITIONER

## 2024-05-16 PROCEDURE — 82785 ASSAY OF IGE: CPT | Performed by: NURSE PRACTITIONER

## 2024-05-16 PROCEDURE — 85025 COMPLETE CBC W/AUTO DIFF WBC: CPT | Performed by: NURSE PRACTITIONER

## 2024-05-16 PROCEDURE — 86606 ASPERGILLUS ANTIBODY: CPT | Performed by: NURSE PRACTITIONER

## 2024-05-16 RX ORDER — ALBUTEROL SULFATE 90 UG/1
2 AEROSOL, METERED RESPIRATORY (INHALATION) EVERY 4 HOURS PRN
Qty: 6.7 G | Refills: 1 | Status: SHIPPED | OUTPATIENT
Start: 2024-05-16

## 2024-05-16 NOTE — PROGRESS NOTES
[facility] Follow up    CHIEF COMPLAINT    Shortness of breath, chest congestion, wheezing    HISTORY OF PRESENT ILLNESS    Layne Aguilar is a 28 y.o.female here today for pulmonary follow-up regarding ongoing shortness of breath, chest congestion, wheezing, and recurrent bronchitis.     Over the past few months dating back to October 2023 she has been treated numerous times for acute bronchitis by her PCP who ultimately referred her to pulmonary for evaluation.  Intent was to see Dr. Sen in July, however patient contacted the office and requested a sooner visit.    She was most recently treated with a steroid taper and Z-Valentin in late March.    Had COVID ~3 years ago and developed a post-viral pneumonia.      Lab work from 2016 did reveal an AEC of 290.  No childhood history of asthma.    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  She does occasionally have significant reflux and does not abide by reflux precautions.    She is postpartum and is about 25-30 pounds heavier than her baseline weight.    Does have issues with seasonal allergies that her not being treated.    Denies recent ED visits or hospitalizations.     Denies fever, chills, night sweats, or hemoptysis. No recent sick contacts. No chest pain or palpitations. Denies lower extremity swelling or calf tenderness.     She is accompanied by her mother.    Patient Active Problem List   Diagnosis    Dysmenorrhea    Umbilical hernia    Premenopausal menorrhagia    Chronic rhinitis    Morbid obesity    Allergic rhinitis    Abscess of abdominal wall    Acute vaginitis    Epidermoid cyst    Otitis media    Pain in left knee    Urinary tract infectious disease    Viral hepatitis C    Morbid obesity with BMI of 60.0-69.9, adult    Supervision of other normal pregnancy, antepartum    Supervision of high-risk pregnancy, third trimester    GERD       No Known Allergies    Current Outpatient  "Medications:     albuterol sulfate  (90 Base) MCG/ACT inhaler, Inhale 2 puffs Every 4 (Four) Hours As Needed for Shortness of Air or Wheezing., Disp: 6.7 g, Rfl: 1    predniSONE (DELTASONE) 10 MG tablet, Take 1 tablet by mouth Daily., Disp: 14 tablet, Rfl: 0    promethazine-dextromethorphan (PROMETHAZINE-DM) 6.25-15 MG/5ML syrup, Take 2.5 mL by mouth 4 (Four) Times a Day As Needed for Cough., Disp: 120 mL, Rfl: 0    albuterol sulfate  (90 Base) MCG/ACT inhaler, Inhale 2 puffs Every 4 (Four) Hours As Needed for Wheezing. (Patient not taking: Reported on 5/16/2024), Disp: 18 g, Rfl: 0    azithromycin (Zithromax Z-Valentin) 250 MG tablet, Take 2 tablets the first day, then 1 tablet daily for 4 days. (Patient not taking: Reported on 5/16/2024), Disp: 6 tablet, Rfl: 0    escitalopram (Lexapro) 10 MG tablet, Take 1 tablet by mouth Daily., Disp: 30 tablet, Rfl: 2  MEDICATION LIST AND ALLERGIES REVIEWED.    Social History     Tobacco Use    Smoking status: Never    Smokeless tobacco: Never   Vaping Use    Vaping status: Never Used   Substance Use Topics    Alcohol use: Not Currently    Drug use: Yes     Types: Marijuana     Comment: less than weekly, cut back with pregnancy       FAMILY AND SOCIAL HISTORY REVIEWED.    Review of Systems   Constitutional:  Negative for chills, fatigue and fever.   HENT:  Positive for postnasal drip.    Respiratory:  Positive for cough, shortness of breath and wheezing. Negative for chest tightness.    Cardiovascular:  Negative for chest pain, palpitations and leg swelling.   Skin:  Negative for color change.   Allergic/Immunologic: Positive for environmental allergies.   Psychiatric/Behavioral:  Negative for sleep disturbance.    All other systems reviewed and are negative.  .    /68   Pulse 78   Temp 97 °F (36.1 °C) (Infrared)   Ht 157.5 cm (62\")   Wt (!) 153 kg (337 lb 8 oz)   SpO2 96% Comment: Room air at rest  BMI 61.73 kg/m²     Immunization History   Administered " Date(s) Administered    COVID-19 (PFIZER) Purple Cap Monovalent 10/19/2021, 11/09/2021    Fluzone (or Fluarix & Flulaval for VFC) >6mos 10/25/2022    Fluzone Quad >6mos (Multi-dose) 10/01/2018    Rho (D) Immune Globulin 10/25/2022    Tdap 10/25/2022    Varicella 03/25/2022       Physical Exam  Vitals reviewed.   Constitutional:       General: She is not in acute distress.     Appearance: Normal appearance.   HENT:      Head:      Comments: Mallampati 2  Cardiovascular:      Rate and Rhythm: Normal rate and regular rhythm.      Pulses: Normal pulses.      Heart sounds: Normal heart sounds.   Pulmonary:      Effort: Pulmonary effort is normal. No respiratory distress.      Breath sounds: No wheezing, rhonchi or rales.   Skin:     General: Skin is warm and dry.   Neurological:      Mental Status: She is alert.         RESULTS    PFTs:  5/16/2024: No airway obstruction, no no restriction, air trapping with residual volume 104%, normal DLCO.    CXR PA/lateral:   Awaiting final MD interpretation. I will contact the patient if abnormal results.     PROBLEM LIST    Problem List Items Addressed This Visit       Morbid obesity    Allergic rhinitis    Relevant Orders    Allergens, Zone 8    GERD     Other Visit Diagnoses       Asthma, unspecified asthma severity, unspecified whether complicated, unspecified whether persistent    -  Primary    Relevant Medications    albuterol sulfate  (90 Base) MCG/ACT inhaler    Other Relevant Orders    XR Chest PA & Lateral    Allergens, Zone 8    Aspergillus Antibodies    Aspergillus Fumigatus IgE    IgE    CBC Auto Differential    Bronchitis        Relevant Medications    albuterol sulfate  (90 Base) MCG/ACT inhaler            DISCUSSION    Ms. Aguilar was seen today to establish care with pulmonary with complaints of ongoing shortness of breath, chest congestion, wheezing, and recurrent bronchitis.    She has been treated numerous times over the past year with antibiotics and  steroids with moderate improvement in her symptoms.    Shortness of breath  Allergic rhinitis  Morbid obesity  GERD  Suspected asthma  Symptoms are likely multifactorial in the setting of her untreated allergic rhinitis, untreated GERD, morbid obesity, and suspected asthma.  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 2-3 hours prior to bedtime  May consider GI referral in the future  Significant seasonal allergies which are not being treated  I recommend Flonase twice daily and a OTC second-generation antihistamine daily (Zyrtec, Claritin, or Allegra) during peak allergy season  Body mass index is 61.73 kg/m².  Her weight and physical deconditioning may also be playing a role.  She is postpartum and is 25-30 pounds heavier than her baseline weight.  I encourage at least 10% weight loss and recommend 30 minutes of intentional exercise a day.  If unrevealing workup may consider CT of the chest in the future   Consider CPX testing in the future    Return to clinic in 4 weeks or sooner if needed.    Moderate level of Medical Decision Making complexity based on 2 or more chronic stable illnesses and prescription drug management.    Electronically signed by Stephanie Macdonald  EDUARD, 05/16/24, 9:01 AM EDT.    Please note that portions of this note were completed with a voice recognition program.        CC: Stephen Hercules PA

## 2024-05-19 LAB
A FLAVUS AB SER QL ID: NEGATIVE
A FUMIGATUS AB SER QL ID: NEGATIVE
A NIGER AB SER QL ID: NEGATIVE

## 2024-05-20 LAB
A ALTERNATA IGE QN: <0.1 KU/L
A FUMIGATUS IGE QN: <0.1 KU/L
A FUMIGATUS IGE QN: <0.1 KU/L
AMER ROACH IGE QN: <0.1 KU/L
BAHIA GRASS IGE QN: 0.33 KU/L
BERMUDA GRASS IGE QN: 0.18 KU/L
BOXELDER IGE QN: 0.18 KU/L
C HERBARUM IGE QN: <0.1 KU/L
CAT DANDER IGE QN: 0.32 KU/L
CMN PIGWEED IGE QN: 0.48 KU/L
COMMON RAGWEED IGE QN: 0.18 KU/L
CONV CLASS DESCRIPTION: ABNORMAL
D FARINAE IGE QN: <0.1 KU/L
D PTERONYSS IGE QN: <0.1 KU/L
DOG DANDER IGE QN: 0.54 KU/L
ENGL PLANTAIN IGE QN: <0.1 KU/L
HAZELNUT POLN IGE QN: 0.62 KU/L
IGE SERPL-ACNC: 111 IU/ML (ref 6–495)
JOHNSON GRASS IGE QN: <0.1 KU/L
KENT BLUE GRASS IGE QN: <0.1 KU/L
LONDON PLANE IGE QN: 0.91 KU/L
M RACEMOSUS IGE QN: <0.1 KU/L
MT JUNIPER IGE QN: 0.2 KU/L
MUGWORT IGE QN: 0.85 KU/L
NETTLE IGE QN: 0.41 KU/L
P NOTATUM IGE QN: <0.1 KU/L
S BOTRYOSUM IGE QN: <0.1 KU/L
SHEEP SORREL IGE QN: 0.38 KU/L
SWEET GUM IGE QN: 0.19 KU/L
WHITE ELM IGE QN: 0.75 KU/L
WHITE HICKORY IGE QN: 0.23 KU/L
WHITE MULBERRY IGE QN: 0.17 KU/L
WHITE OAK IGE QN: <0.1 KU/L

## 2025-01-26 ENCOUNTER — HOSPITAL ENCOUNTER (EMERGENCY)
Facility: HOSPITAL | Age: 30
Discharge: HOME | End: 2025-01-26
Attending: EMERGENCY MEDICINE | Admitting: EMERGENCY MEDICINE
Payer: MEDICAID

## 2025-01-26 VITALS
HEIGHT: 65 IN | OXYGEN SATURATION: 99 % | SYSTOLIC BLOOD PRESSURE: 148 MMHG | DIASTOLIC BLOOD PRESSURE: 75 MMHG | HEART RATE: 100 BPM | RESPIRATION RATE: 18 BRPM | TEMPERATURE: 102 F | WEIGHT: 293 LBS | BODY MASS INDEX: 48.82 KG/M2

## 2025-01-26 DIAGNOSIS — J10.1 INFLUENZA A: Primary | ICD-10-CM

## 2025-01-26 LAB
FLUAV RNA RESP QL NAA+PROBE: DETECTED
FLUBV RNA RESP QL NAA+PROBE: NOT DETECTED
RSV RNA RESP QL NAA+PROBE: NOT DETECTED
SARS-COV-2 RNA RESP QL NAA+PROBE: NOT DETECTED

## 2025-01-26 PROCEDURE — 87637 SARSCOV2&INF A&B&RSV AMP PRB: CPT | Performed by: EMERGENCY MEDICINE

## 2025-01-26 PROCEDURE — 99283 EMERGENCY DEPT VISIT LOW MDM: CPT

## 2025-01-26 NOTE — Clinical Note
[facility] EMERGENCY DEPARTMENT [place]  [address]  [facility] [address]  Phone: [phone]  Fax: [phone]    Layne Aguilar was seen and treated in our emergency department on 1/26/2025.  She may return to work on 01/27/2025.         Thank you for choosing [facility].    Yolanda Manzanares RN

## 2025-01-26 NOTE — FSED PROVIDER NOTE
Subjective   History of Present Illness  Patient presents to the emergency department for flulike symptoms.  Daughter tested positive for influenza A yesterday states that she has had fever body aches and cough similar to her daughter.  She did get a flu shot earlier this season.  She denies nausea or vomiting.  No chest pain or shortness of breath.  Does note some redness and pain at the base of her thumbnail on the left hand but no other complaints    History provided by:  Patient      Review of Systems   Constitutional:  Positive for chills, fatigue and fever.   Respiratory:  Positive for cough.    Skin:         Paronychia       Past Medical History:   Diagnosis Date    Acute maxillary sinusitis     Acute pharyngitis     Fatigue     History of COVID-19 2021    History of pneumonia     covid pneumonia    Knee pain     PCOS (polycystic ovarian syndrome)        No Known Allergies    Past Surgical History:   Procedure Laterality Date    ADENOIDECTOMY       SECTION N/A 2023    Procedure:  SECTION PRIMARY;  Surgeon: Mukesh Henry MD;  Location: [facility] LABOR DELIVERY;  Service: Obstetrics/Gynecology;  Laterality: N/A;    TONSILLECTOMY         Family History   Problem Relation Age of Onset    Diabetes Father     Hypertension Mother     No Known Problems Brother     Diabetes Paternal Grandfather     Hypertension Maternal Grandfather     Stroke Maternal Grandfather     Kidney disease Maternal Grandfather     Diabetes Other     Breast cancer Neg Hx     Ovarian cancer Neg Hx     Uterine cancer Neg Hx     Colon cancer Neg Hx        Social History     Socioeconomic History    Marital status: Single   Tobacco Use    Smoking status: Never    Smokeless tobacco: Never   Vaping Use    Vaping status: Never Used   Substance and Sexual Activity    Alcohol use: Not Currently    Drug use: Yes     Types: Marijuana     Comment: less than weekly, cut back with pregnancy    Sexual activity: Yes           Objective    Physical Exam  Vitals and nursing note reviewed.   Constitutional:       Appearance: Normal appearance. She is obese. She is not toxic-appearing.   HENT:      Head: Normocephalic and atraumatic.      Nose: Nose normal. No congestion.      Mouth/Throat:      Mouth: Mucous membranes are moist.      Pharynx: Oropharynx is clear.   Eyes:      Extraocular Movements: Extraocular movements intact.      Pupils: Pupils are equal, round, and reactive to light.   Cardiovascular:      Rate and Rhythm: Regular rhythm. Tachycardia present.      Pulses: Normal pulses.      Heart sounds: Normal heart sounds. No murmur heard.  Pulmonary:      Effort: Pulmonary effort is normal. No respiratory distress.      Breath sounds: Normal breath sounds. No wheezing, rhonchi or rales.   Chest:      Chest wall: No tenderness.   Abdominal:      General: Abdomen is flat. There is no distension.      Tenderness: There is no abdominal tenderness. There is no guarding or rebound.   Musculoskeletal:         General: No swelling, tenderness, deformity or signs of injury. Normal range of motion.      Cervical back: Normal range of motion and neck supple. No rigidity.   Lymphadenopathy:      Cervical: No cervical adenopathy.   Skin:     General: Skin is warm and dry.      Capillary Refill: Capillary refill takes less than 2 seconds.      Comments: Mild erythema swelling at the base of the left thumbnail.  No purulent drainage   Neurological:      General: No focal deficit present.      Mental Status: She is alert and oriented to person, place, and time.      Cranial Nerves: No cranial nerve deficit.      Motor: No weakness.      Coordination: Coordination normal.   Psychiatric:         Mood and Affect: Mood normal.         Behavior: Behavior normal.         Procedures           ED Course                                           Medical Decision Making  Hemodynamically stable but febrile.  Patient is nontoxic-appearing she did test positive for  influenza A.  This is consistent with her symptoms.  She is tolerating p.o. says she is feeling otherwise well does feel comfortable going home with supportive care instructions.  Given return precautions and discharge    Problems Addressed:  Influenza A: acute illness or injury        Final diagnoses:   Influenza A       ED Disposition  ED Disposition       ED Disposition   Discharge    Condition   Stable    Comment   --               Stephen Hercules PA  [address]  [place] [id number]  [phone]    Schedule an appointment as soon as possible for a visit   As needed    [facility] EMERGENCY DEPARTMENT [place]  [address]  [facility] [id number] [phone]  Go to   As needed         Medication List      No changes were made to your prescriptions during this visit.

## 2025-01-26 NOTE — Clinical Note
[facility] EMERGENCY DEPARTMENT [place]  [address]  [facility] [address]  Phone: [phone]  Fax: [phone]    Layne Aguilar was seen and treated in our emergency department on 1/26/2025.  She may return to work on 01/27/2025.         Thank you for choosing [facility].    Yolanda Manzanares RN

## 2025-01-28 ENCOUNTER — APPOINTMENT (OUTPATIENT)
Facility: HOSPITAL | Age: 30
End: 2025-01-28
Payer: MEDICAID

## 2025-01-28 ENCOUNTER — HOSPITAL ENCOUNTER (EMERGENCY)
Facility: HOSPITAL | Age: 30
Discharge: HOME | End: 2025-01-28
Attending: EMERGENCY MEDICINE | Admitting: EMERGENCY MEDICINE
Payer: MEDICAID

## 2025-01-28 ENCOUNTER — HOSPITAL ENCOUNTER (EMERGENCY)
Facility: HOSPITAL | Age: 30
Discharge: LEFT BEFORE DISCHARGE | End: 2025-01-28
Payer: MEDICAID

## 2025-01-28 VITALS
OXYGEN SATURATION: 100 % | TEMPERATURE: 100.9 F | DIASTOLIC BLOOD PRESSURE: 106 MMHG | HEART RATE: 88 BPM | BODY MASS INDEX: 48.82 KG/M2 | RESPIRATION RATE: 18 BRPM | WEIGHT: 293 LBS | HEIGHT: 65 IN | SYSTOLIC BLOOD PRESSURE: 136 MMHG

## 2025-01-28 DIAGNOSIS — L03.019 PARONYCHIA OF THUMB, UNSPECIFIED LATERALITY: ICD-10-CM

## 2025-01-28 DIAGNOSIS — J10.1 INFLUENZA A: Primary | ICD-10-CM

## 2025-01-28 PROCEDURE — 71045 X-RAY EXAM CHEST 1 VIEW: CPT

## 2025-01-28 PROCEDURE — 99283 EMERGENCY DEPT VISIT LOW MDM: CPT

## 2025-01-28 PROCEDURE — 99211 OFF/OP EST MAY X REQ PHY/QHP: CPT

## 2025-01-28 RX ORDER — CEPHALEXIN 500 MG/1
500 CAPSULE ORAL 4 TIMES DAILY
Qty: 28 CAPSULE | Refills: 0 | Status: SHIPPED | OUTPATIENT
Start: 2025-01-28 | End: 2025-02-04

## 2025-01-28 RX ORDER — SODIUM CHLORIDE 0.9 % (FLUSH) 0.9 %
10 SYRINGE (ML) INJECTION AS NEEDED
Status: DISCONTINUED | OUTPATIENT
Start: 2025-01-28 | End: 2025-01-28

## 2025-01-28 NOTE — FSED PROVIDER NOTE
Subjective   History of Present Illness  Patient presents to the emergency department for flu.  Has had bodyaches and cough.  Was diagnosed with influenza a here several days ago but says she has not gotten any better.  She continues have a cough and feel generally fatigued and achy.  She denies any nausea or vomiting.  No alleviating or exacerbating factors    History provided by:  Patient   used: No        Review of Systems   Constitutional:  Positive for chills, fatigue and fever.   Respiratory:  Positive for cough.    All other systems reviewed and are negative.      Past Medical History:   Diagnosis Date    Acute maxillary sinusitis     Acute pharyngitis     Fatigue     History of COVID-19 2021    History of pneumonia     covid pneumonia    Knee pain     PCOS (polycystic ovarian syndrome)        No Known Allergies    Past Surgical History:   Procedure Laterality Date    ADENOIDECTOMY       SECTION N/A 2023    Procedure:  SECTION PRIMARY;  Surgeon: Mukesh Henry MD;  Location: [facility] LABOR DELIVERY;  Service: Obstetrics/Gynecology;  Laterality: N/A;    TONSILLECTOMY         Family History   Problem Relation Age of Onset    Diabetes Father     Hypertension Mother     No Known Problems Brother     Diabetes Paternal Grandfather     Hypertension Maternal Grandfather     Stroke Maternal Grandfather     Kidney disease Maternal Grandfather     Diabetes Other     Breast cancer Neg Hx     Ovarian cancer Neg Hx     Uterine cancer Neg Hx     Colon cancer Neg Hx        Social History     Socioeconomic History    Marital status: Single   Tobacco Use    Smoking status: Never    Smokeless tobacco: Never   Vaping Use    Vaping status: Never Used   Substance and Sexual Activity    Alcohol use: Not Currently    Drug use: Yes     Types: Marijuana     Comment: less than weekly, cut back with pregnancy    Sexual activity: Yes           Objective   Physical Exam  Vitals and nursing note  reviewed.   Constitutional:       Appearance: Normal appearance. She is obese.   HENT:      Head: Normocephalic and atraumatic.      Nose: Nose normal.      Mouth/Throat:      Mouth: Mucous membranes are moist.      Pharynx: Oropharynx is clear.   Eyes:      Extraocular Movements: Extraocular movements intact.      Pupils: Pupils are equal, round, and reactive to light.   Cardiovascular:      Rate and Rhythm: Normal rate and regular rhythm.      Pulses: Normal pulses.      Heart sounds: Normal heart sounds.   Pulmonary:      Effort: Pulmonary effort is normal. No respiratory distress.      Breath sounds: Normal breath sounds. No wheezing, rhonchi or rales.   Abdominal:      General: Abdomen is flat. There is no distension.      Tenderness: There is no abdominal tenderness. There is no rebound.   Musculoskeletal:         General: No swelling, tenderness or deformity. Normal range of motion.      Cervical back: Normal range of motion and neck supple.   Skin:     General: Skin is warm and dry.      Capillary Refill: Capillary refill takes less than 2 seconds.      Findings: Erythema (Paronychia of thumb without fluctuance) present.   Neurological:      General: No focal deficit present.      Mental Status: She is alert and oriented to person, place, and time.      Cranial Nerves: No cranial nerve deficit.   Psychiatric:         Mood and Affect: Mood normal.         Behavior: Behavior normal.         Procedures           ED Course                                           Medical Decision Making  Hemodynamically stable but febrile.  Patient has no influenza A.  Chest x-ray does not show any acute infiltrates.  She is oxygenating normally.  Nontoxic-appearing.  Patient also incidentally has a paronychia without abscess of the thumb.  Will give antibiotics for this.  Given supportive care instructions for her influenza and return precautions and discharge    Amount and/or Complexity of Data Reviewed  Radiology: ordered.  Decision-making details documented in ED Course.        Final diagnoses:   Influenza A   Paronychia of thumb, unspecified laterality       ED Disposition  ED Disposition       ED Disposition   Discharge    Condition   Stable    Comment   --               Stephen Hercules, PA  [address]  [place] [id number]  [phone]    Schedule an appointment as soon as possible for a visit   As needed    [facility] EMERGENCY DEPARTMENT [place]  [address]  [facility] [address] [phone]  Go to   As needed         Medication List        New Prescriptions      cephalexin 500 MG capsule  Commonly known as: KEFLEX  Take 1 capsule by mouth 4 (Four) Times a Day for 7 days.               Where to Get Your Medications        These medications were sent to [facility] [id number] - [place], [place] - [address] - [phone] PH - [phone] FX  [address], [facility] [address]      Phone: [phone]   cephalexin 500 MG capsule

## 2025-01-28 NOTE — Clinical Note
[facility] EMERGENCY DEPARTMENT [place]  [address]  [facility] [address]  Phone: [phone]  Fax: [phone]    Layne Aguilar was seen and treated in our emergency department on 1/28/2025.  She may return to work on 01/31/2025.  Pt should be fever free x 24 hours before returning too work       Thank you for choosing [facility].    Rickey Bolanos MD

## 2025-01-29 RX ORDER — OSELTAMIVIR PHOSPHATE 75 MG/1
75 CAPSULE ORAL 2 TIMES DAILY
Qty: 10 CAPSULE | Refills: 0 | Status: SHIPPED | OUTPATIENT
Start: 2025-01-29

## (undated) DEVICE — SOL IRR NACL 0.9PCT BT 1000ML

## (undated) DEVICE — SOL IRR H2O BTL 1000ML STRL

## (undated) DEVICE — TRAP FLD MINIVAC MEGADYNE 100ML

## (undated) DEVICE — SUT GUT CHRM 1 CTX 36IN 905H

## (undated) DEVICE — MAT PREVALON MOBL TRANSFR AIR W/PAD REPROC 39X81IN

## (undated) DEVICE — CLTH CLENS READYCLEANSE PERI CARE PK/5

## (undated) DEVICE — BOWL UTIL STRL 32OZ

## (undated) DEVICE — SUT VIC 2/0 CT1 27IN J339H BX/36

## (undated) DEVICE — ADHS LIQ MASTISOL 2/3ML

## (undated) DEVICE — APPL CHLORAPREP TINTED 26ML TEAL

## (undated) DEVICE — PATIENT RETURN ELECTRODE, SINGLE-USE, CONTACT QUALITY MONITORING, ADULT, WITH 9FT CORD, FOR PATIENTS WEIGING OVER 33LBS. (15KG): Brand: MEGADYNE

## (undated) DEVICE — PK C/SECT 10

## (undated) DEVICE — COATED VICRYL  (POLYGLACTIN 910) SUTURE, VIOLET BRAIDED, STERILE, SYNTHETIC ABSORBABLE SUTURE: Brand: COATED VICRYL

## (undated) DEVICE — SUT PLAIN  3/0 CT1 27IN 842H

## (undated) DEVICE — PROXIMATE RH ROTATING HEAD SKIN STAPLERS (35 WIDE) CONTAINS 35 STAINLESS STEEL STAPLES: Brand: PROXIMATE

## (undated) DEVICE — 4-PORT MANIFOLD: Brand: NEPTUNE 2

## (undated) DEVICE — TRY SPINE BLCK WHITACRE 25G 3X5IN

## (undated) DEVICE — TBG PENCL TELESCP MEGADYNE SMOKE EVAC 10FT

## (undated) DEVICE — GLV SURG BIOGEL LTX PF 7 1/2

## (undated) DEVICE — SUT VIC 3/0 PS2 27IN J427H

## (undated) DEVICE — 3M™ STERI-STRIP™ REINFORCED ADHESIVE SKIN CLOSURES, R1547, 1/2 IN X 4 IN (12 MM X 100 MM), 6 STRIPS/ENVELOPE: Brand: 3M™ STERI-STRIP™